# Patient Record
Sex: MALE | Race: WHITE | NOT HISPANIC OR LATINO | Employment: OTHER | URBAN - METROPOLITAN AREA
[De-identification: names, ages, dates, MRNs, and addresses within clinical notes are randomized per-mention and may not be internally consistent; named-entity substitution may affect disease eponyms.]

---

## 2017-12-09 ENCOUNTER — HOSPITAL ENCOUNTER (EMERGENCY)
Facility: MEDICAL CENTER | Age: 67
End: 2017-12-09
Attending: EMERGENCY MEDICINE
Payer: MEDICARE

## 2017-12-09 VITALS
BODY MASS INDEX: 37.04 KG/M2 | WEIGHT: 264.55 LBS | SYSTOLIC BLOOD PRESSURE: 152 MMHG | RESPIRATION RATE: 20 BRPM | HEIGHT: 71 IN | OXYGEN SATURATION: 92 % | DIASTOLIC BLOOD PRESSURE: 74 MMHG | TEMPERATURE: 97.6 F | HEART RATE: 56 BPM

## 2017-12-09 DIAGNOSIS — I80.01 THROMBOPHLEBITIS OF SAPHENOUS VEIN, RIGHT: ICD-10-CM

## 2017-12-09 LAB
ANION GAP SERPL CALC-SCNC: 6 MMOL/L (ref 0–11.9)
APTT PPP: 33.4 SEC (ref 24.7–36)
BASOPHILS # BLD AUTO: 0.4 % (ref 0–1.8)
BASOPHILS # BLD: 0.03 K/UL (ref 0–0.12)
BUN SERPL-MCNC: 11 MG/DL (ref 8–22)
CALCIUM SERPL-MCNC: 9 MG/DL (ref 8.5–10.5)
CHLORIDE SERPL-SCNC: 104 MMOL/L (ref 96–112)
CO2 SERPL-SCNC: 27 MMOL/L (ref 20–33)
CREAT SERPL-MCNC: 0.56 MG/DL (ref 0.5–1.4)
EOSINOPHIL # BLD AUTO: 0.16 K/UL (ref 0–0.51)
EOSINOPHIL NFR BLD: 2 % (ref 0–6.9)
ERYTHROCYTE [DISTWIDTH] IN BLOOD BY AUTOMATED COUNT: 42.7 FL (ref 35.9–50)
GFR SERPL CREATININE-BSD FRML MDRD: >60 ML/MIN/1.73 M 2
GLUCOSE SERPL-MCNC: 183 MG/DL (ref 65–99)
HCT VFR BLD AUTO: 41.9 % (ref 42–52)
HGB BLD-MCNC: 14.2 G/DL (ref 14–18)
IMM GRANULOCYTES # BLD AUTO: 0.02 K/UL (ref 0–0.11)
IMM GRANULOCYTES NFR BLD AUTO: 0.3 % (ref 0–0.9)
INR PPP: 1.05 (ref 0.87–1.13)
LYMPHOCYTES # BLD AUTO: 1.84 K/UL (ref 1–4.8)
LYMPHOCYTES NFR BLD: 23.3 % (ref 22–41)
MCH RBC QN AUTO: 31.3 PG (ref 27–33)
MCHC RBC AUTO-ENTMCNC: 33.9 G/DL (ref 33.7–35.3)
MCV RBC AUTO: 92.5 FL (ref 81.4–97.8)
MONOCYTES # BLD AUTO: 0.86 K/UL (ref 0–0.85)
MONOCYTES NFR BLD AUTO: 10.9 % (ref 0–13.4)
NEUTROPHILS # BLD AUTO: 4.99 K/UL (ref 1.82–7.42)
NEUTROPHILS NFR BLD: 63.1 % (ref 44–72)
NRBC # BLD AUTO: 0 K/UL
NRBC BLD AUTO-RTO: 0 /100 WBC
PLATELET # BLD AUTO: 168 K/UL (ref 164–446)
PMV BLD AUTO: 9.2 FL (ref 9–12.9)
POTASSIUM SERPL-SCNC: 4.3 MMOL/L (ref 3.6–5.5)
PROTHROMBIN TIME: 13.4 SEC (ref 12–14.6)
RBC # BLD AUTO: 4.53 M/UL (ref 4.7–6.1)
SODIUM SERPL-SCNC: 137 MMOL/L (ref 135–145)
WBC # BLD AUTO: 7.9 K/UL (ref 4.8–10.8)

## 2017-12-09 PROCEDURE — 85730 THROMBOPLASTIN TIME PARTIAL: CPT

## 2017-12-09 PROCEDURE — 99284 EMERGENCY DEPT VISIT MOD MDM: CPT

## 2017-12-09 PROCEDURE — 700102 HCHG RX REV CODE 250 W/ 637 OVERRIDE(OP): Performed by: EMERGENCY MEDICINE

## 2017-12-09 PROCEDURE — 85025 COMPLETE CBC W/AUTO DIFF WBC: CPT

## 2017-12-09 PROCEDURE — A9270 NON-COVERED ITEM OR SERVICE: HCPCS | Performed by: EMERGENCY MEDICINE

## 2017-12-09 PROCEDURE — 85610 PROTHROMBIN TIME: CPT

## 2017-12-09 PROCEDURE — 36415 COLL VENOUS BLD VENIPUNCTURE: CPT

## 2017-12-09 PROCEDURE — 80048 BASIC METABOLIC PNL TOTAL CA: CPT

## 2017-12-09 PROCEDURE — 93971 EXTREMITY STUDY: CPT

## 2017-12-09 PROCEDURE — 93971 EXTREMITY STUDY: CPT | Mod: 26 | Performed by: SURGERY

## 2017-12-09 RX ORDER — HYDROCODONE BITARTRATE AND ACETAMINOPHEN 5; 325 MG/1; MG/1
2 TABLET ORAL ONCE
Status: COMPLETED | OUTPATIENT
Start: 2017-12-09 | End: 2017-12-09

## 2017-12-09 RX ADMIN — RIVAROXABAN 15 MG: 15 TABLET, FILM COATED ORAL at 09:09

## 2017-12-09 RX ADMIN — HYDROCODONE BITARTRATE AND ACETAMINOPHEN 2 TABLET: 5; 325 TABLET ORAL at 08:11

## 2017-12-09 ASSESSMENT — LIFESTYLE VARIABLES: DO YOU DRINK ALCOHOL: NO

## 2017-12-09 ASSESSMENT — PAIN SCALES - GENERAL: PAINLEVEL_OUTOF10: 8

## 2017-12-09 NOTE — ED PROVIDER NOTES
ED Provider Note    Scribed for Tapan Allred M.D. by Mitch Watson. 12/9/2017  7:20 AM    Primary care provider: Amanda Guillaume M.D.  Means of arrival: Walk in  History obtained from: Patient  History limited by: None    CHIEF COMPLAINT  Chief Complaint   Patient presents with   • Leg Pain     R sided   • Leg Swelling       HPI  Lamont Hogan is a 67 y.o. male who presents to the Emergency Department complaining of gradual right leg swelling onset 8 days ago, on Monday. He is experiencing associated right leg redness and pain secondary to the swelling. The patient states he has a history of varicose veins. He denies taking any blood thinners. No complaints of shortness of breath or chest pain at this time.     REVIEW OF SYSTEMS  Pertinent positives include right leg swelling with redness and pain.   Pertinent negatives include no shortness of breath or chest pain.    All other systems reviewed and negative. C.     PAST MEDICAL HISTORY   has a past medical history of Arthritis; Cataract; Diabetes (CMS-[facility]); and Hypertension.    SURGICAL HISTORY   has a past surgical history that includes other (1992); other (Left, 2011); appendectomy (1990); alvin by laparoscopy (1990); cataract phaco with iol (Right, 10/11/2016); and cataract phaco with iol (Left, 10/25/2016).    SOCIAL HISTORY  Social History   Substance Use Topics   • Smoking status: Former Smoker     Packs/day: 1.00     Years: 30.00     Types: Cigarettes     Quit date: 1/1/2000   • Smokeless tobacco: Never Used   • Alcohol use No      History   Drug Use No       FAMILY HISTORY  History reviewed. No pertinent family history.    CURRENT MEDICATIONS  Home Medications     Reviewed by Monty Ochoa R.N. (Registered Nurse) on 12/09/17 at 0705  Med List Status: Partial   Medication Last Dose Status   acetaminophen (TYLENOL ARTHRITIS PAIN) 650 MG CR tablet 10/24/2016 Active   atenolol (TENORMIN) 25 MG Tab 10/25/2016 Active   atorvastatin (LIPITOR) 10 MG Tab  "10/24/2016 Active   cholestyramine (QUESTRAN,PREVALITE) 4 GM Pack 10/24/2016 Active   enalapril (VASOTEC) 20 MG tablet 10/25/2016 Active   gabapentin (NEURONTIN) 300 MG Cap 10/25/2016 Active   hydrocodone-acetaminophen (VICODIN) 5-500 MG Tab 10/25/2016 Active   metformin (GLUCOPHAGE) 500 MG Tab 10/24/2016 Active                ALLERGIES  Allergies   Allergen Reactions   • Ampicillin      Dizzy and nausea       PHYSICAL EXAM  VITAL SIGNS: /74   Pulse 74   Temp 36.4 °C (97.6 °F) (Temporal)   Resp 18   Ht 1.803 m (5' 11\")   Wt 120 kg (264 lb 8.8 oz)   SpO2 95%   BMI 36.90 kg/m²     Nursing note and vitals reviewed.  Constitutional: Well-developed and well-nourished. No distress.   HENT: Head is normocephalic and atraumatic. Oropharynx is clear and moist without exudate or erythema.   Eyes: Pupils are equal, round, and reactive to light. Conjunctiva are normal.   Cardiovascular: Normal rate and regular rhythm. No murmur heard. Normal radial pulses.  Pulmonary/Chest: Breath sounds normal. No wheezes or rales.   Abdominal: Soft and non-tender. No distention    Musculoskeletal: Tenderness and swelling with a palpable thrombosed varicose vein in the right medial proximal calf, diffuse 2+ pitting edema of the right lower leg, mild tenderness to medial thigh, Extremities exhibit normal range of motion   Neurological: Awake, alert and oriented to person, place, and time. No focal deficits noted.  Skin: Skin is warm and dry. No rash.   Psychiatric: Normal mood and affect. Appropriate for clinical situation    DIAGNOSTIC STUDIES / PROCEDURES    LABS  Results for orders placed or performed during the hospital encounter of 12/09/17   CBC WITH DIFFERENTIAL   Result Value Ref Range    WBC 7.9 4.8 - 10.8 K/uL    RBC 4.53 (L) 4.70 - 6.10 M/uL    Hemoglobin 14.2 14.0 - 18.0 g/dL    Hematocrit 41.9 (L) 42.0 - 52.0 %    MCV 92.5 81.4 - 97.8 fL    MCH 31.3 27.0 - 33.0 pg    MCHC 33.9 33.7 - 35.3 g/dL    RDW 42.7 35.9 - 50.0 " fL    Platelet Count 168 164 - 446 K/uL    MPV 9.2 9.0 - 12.9 fL    Neutrophils-Polys 63.10 44.00 - 72.00 %    Lymphocytes 23.30 22.00 - 41.00 %    Monocytes 10.90 0.00 - 13.40 %    Eosinophils 2.00 0.00 - 6.90 %    Basophils 0.40 0.00 - 1.80 %    Immature Granulocytes 0.30 0.00 - 0.90 %    Nucleated RBC 0.00 /100 WBC    Neutrophils (Absolute) 4.99 1.82 - 7.42 K/uL    Lymphs (Absolute) 1.84 1.00 - 4.80 K/uL    Monos (Absolute) 0.86 (H) 0.00 - 0.85 K/uL    Eos (Absolute) 0.16 0.00 - 0.51 K/uL    Baso (Absolute) 0.03 0.00 - 0.12 K/uL    Immature Granulocytes (abs) 0.02 0.00 - 0.11 K/uL    NRBC (Absolute) 0.00 K/uL   BASIC METABOLIC PANEL   Result Value Ref Range    Sodium 137 135 - 145 mmol/L    Potassium 4.3 3.6 - 5.5 mmol/L    Chloride 104 96 - 112 mmol/L    Co2 27 20 - 33 mmol/L    Glucose 183 (H) 65 - 99 mg/dL    Bun 11 8 - 22 mg/dL    Creatinine 0.56 0.50 - 1.40 mg/dL    Calcium 9.0 8.5 - 10.5 mg/dL    Anion Gap 6.0 0.0 - 11.9   PROTHROMBIN TIME   Result Value Ref Range    PT 13.4 12.0 - 14.6 sec    INR 1.05 0.87 - 1.13   APTT   Result Value Ref Range    APTT 33.4 24.7 - 36.0 sec   ESTIMATED GFR   Result Value Ref Range    GFR If African American >60 >60 mL/min/1.73 m 2    GFR If Non African American >60 >60 mL/min/1.73 m 2      All labs reviewed by me.    RADIOLOGY  LE VENOUS DUPLEX (Specify in Comments Left, Right Or Bilateral)           The radiologist's interpretation of all radiological studies have been reviewed by me.    COURSE & MEDICAL DECISION MAKING  Nursing notes, VS, PMSFHx reviewed in chart.     Review of past medical records shows the patient has no recent ED visits.     7:20 AM - Patient seen and examined at bedside. Patient will be treated with Norco 5-325 2 tab. Ordered LE venous duplex, CBC, BMP, prothrombin time and APTT to evaluate his symptoms. The differential diagnoses include but are not limited to: Deep venous thrombosis, superficial thrombophlebitis. I explained to the patient it  appears he is experiencing a blood clot in his leg upon examination, which I will order an US for to further assess his condition.     7:59 AM Given the proximity of the saphenous thrombosis to deep system, patient is appropriate candidate for anticoagulation.     8:03 AM Ordered Xarelto 15 mg.     8:04 AM Patient was reevaluated at bedside. Discussed the status of the patient's clot and recommended follow up. The patient will be discharged with blood thinners. Patient agrees to plan of care.       The patient will return for new or worsening symptoms and is stable at the time of discharge.    The patient is referred to a primary physician for blood pressure management, diabetic screening, and for all other preventative health concerns.    DISPOSITION:  Patient will be discharged home in stable condition.    FOLLOW UP:  [facility], Emergency Dept  [address] [address] [phone]    If symptoms worsen    [facility]ticoagulation [facility]  [address] [place] [id number]  [phone]    Schedule an appointment as soon as possible for a visit      OUTPATIENT MEDICATIONS:  New Prescriptions    RIVAROXABAN (XARELTO) 15 MG TAB TABLET    Take 1 Tab by mouth 2 Times a Day for 21 days.     FINAL IMPRESSION  1. Thrombophlebitis of saphenous vein, right        Mitch FREITAS (Jua nMiguel), am scribing for, and in the presence of, Tapan Allred M.D..    Electronically signed by: Mitch Watson (Juan Miguel), 12/9/2017    Tapan FREITAS M.D. personally performed the services described in this documentation, as scribed by Mitch Watson in my presence, and it is both accurate and complete.    The note accurately reflects work and decisions made by me.  Tapan Allred  12/9/2017  11:36 AM

## 2017-12-09 NOTE — ED NOTES
Discharge instructions given.  All questions answered.  Prescriptions given x1.  Pt to follow-up with Anticoagulation services, return here if symptoms worsen.  Pt verbalized understanding.  All belongings with pt.  Pt ambulated to lobby.

## 2017-12-09 NOTE — ED NOTES
"Chief Complaint   Patient presents with   • Leg Pain     R sided   • Leg Swelling     Blood pressure 152/74, pulse 74, temperature 36.4 °C (97.6 °F), temperature source Temporal, resp. rate 18, height 1.803 m (5' 11\"), weight 120 kg (264 lb 8.8 oz), SpO2 95 %.    Pt ambulatory to triage. Pt c/o pain since Monday. 7/10. Redness and swelling on R inner calf. Pt leg is warm to touch. Pt is not on any blood thinners. No cardiac hx. Pt denies trauma.     Explained wait time and triage process to pt. Pt placed back out in lobby, told to notify ED tech or triage RN of any changes, verbalized understanding.      "

## 2017-12-09 NOTE — DISCHARGE INSTRUCTIONS
Phlebitis  Phlebitis is soreness and swelling (inflammation) of a vein. This can occur in your arms, legs, or torso (trunk), as well as deeper inside your body. Phlebitis is usually not serious when it occurs close to the surface of the body. However, it can cause serious problems when it occurs in a vein deeper inside the body.  CAUSES   Phlebitis can be triggered by various things, including:   · Reduced blood flow through your veins. This can happen with:  ¨ Bed rest over a long period.  ¨ Long-distance travel.  ¨ Injury.  ¨ Surgery.  ¨ Being overweight (obese) or pregnant.  · Having an IV tube put in the vein and getting certain medicines through the vein.  · Cancer and cancer treatment.  · Use of illegal drugs taken through the vein.  · Inflammatory diseases.  · Inherited (genetic) diseases that increase the risk of blood clots.  · Hormone therapy, such as birth control pills.  SIGNS AND SYMPTOMS   · Red, tender, swollen, and painful area on your skin. Usually, the area will be long and narrow.  · Firmness along the center of the affected area. This can indicate that a blood clot has formed.  · Low-grade fever.  DIAGNOSIS   A health care provider can usually diagnose phlebitis by examining the affected area and asking about your symptoms. To check for infection or blood clots, your health care provider may order blood tests or an ultrasound exam of the area. Blood tests and your family history may also indicate if you have an underlying genetic disease that causes blood clots. Occasionally, a piece of tissue is taken from the body (biopsy sample) if an unusual cause of phlebitis is suspected.  TREATMENT   Treatment will vary depending on the severity of the condition and the area of the body affected. Treatment may include:  · Use of a warm compress or heating pad.  · Use of compression stockings or bandages.  · Anti-inflammatory medicines.  · Removal of any IV tube that may be causing the problem.  · Medicines  that kill germs (antibiotics) if an infection is present.  · Blood-thinning medicines if a blood clot is suspected or present.  · In rare cases, surgery may be needed to remove damaged sections of vein.  HOME CARE INSTRUCTIONS   · Only take over-the-counter or prescription medicines as directed by your health care provider. Take all medicines exactly as prescribed.  · Raise (elevate) the affected area above the level of your heart as directed by your health care provider.  · Apply a warm compress or heating pad to the affected area as directed by your health care provider. Do not sleep with the heating pad.  · Use compression stockings or bandages as directed. These will speed healing and prevent the condition from coming back.  · If you are on blood thinners:  ¨ Get follow-up blood tests as directed by your health care provider.  ¨ Check with your health care provider before using any new medicines.  ¨ Carry a medical alert card or wear your medical alert jewelry to show that you are on blood thinners.  · For phlebitis in the legs:  ¨ Avoid prolonged standing or bed rest.  ¨ Keep your legs moving. Raise your legs when sitting or lying.  · Do not smoke.  · Women, particularly those over the age of 35, should consider the risks and benefits of taking the contraceptive pill. This kind of hormone treatment can increase your risk for blood clots.  · Follow up with your health care provider as directed.  SEEK MEDICAL CARE IF:   · You have unusual bruising or any bleeding problems.  · Your swelling or pain in the affected area is not improving.  · You are on anti-inflammatory medicine, and you develop belly (abdominal) pain.  SEEK IMMEDIATE MEDICAL CARE IF:   · You have a sudden onset of chest pain or difficulty breathing.  · You have a fever or persistent symptoms for more than 2-3 days.  · You have a fever and your symptoms suddenly get worse.  MAKE SURE YOU:  · Understand these instructions.  · Will watch your  condition.  · Will get help right away if you are not doing well or get worse.     This information is not intended to replace advice given to you by your health care provider. Make sure you discuss any questions you have with your health care provider.     Document Released: 12/12/2002 Document Revised: 10/08/2014 Document Reviewed: 08/25/2014  [employer] Interactive Patient Education ©2016 [employer]

## 2017-12-14 ENCOUNTER — ANTICOAGULATION VISIT (OUTPATIENT)
Dept: VASCULAR LAB | Facility: MEDICAL CENTER | Age: 67
End: 2017-12-14
Attending: EMERGENCY MEDICINE
Payer: MEDICARE

## 2017-12-14 ENCOUNTER — TELEPHONE (OUTPATIENT)
Dept: VASCULAR LAB | Facility: MEDICAL CENTER | Age: 67
End: 2017-12-14

## 2017-12-14 VITALS — HEART RATE: 106 BPM | OXYGEN SATURATION: 96 % | SYSTOLIC BLOOD PRESSURE: 154 MMHG | DIASTOLIC BLOOD PRESSURE: 88 MMHG

## 2017-12-14 DIAGNOSIS — I82.409 ACUTE DEEP VEIN THROMBOSIS (DVT) OF OTHER VEIN OF LOWER EXTREMITY, UNSPECIFIED LATERALITY (HCC): ICD-10-CM

## 2017-12-14 DIAGNOSIS — I82.890 SUPERFICIAL VEIN THROMBOSIS: ICD-10-CM

## 2017-12-14 LAB
INR BLD: 1.4 (ref 0.9–1.2)
INR PPP: 1.4 (ref 2–3.5)

## 2017-12-14 PROCEDURE — 99213 OFFICE O/P EST LOW 20 MIN: CPT

## 2017-12-14 PROCEDURE — 85610 PROTHROMBIN TIME: CPT

## 2017-12-14 NOTE — PROGRESS NOTES
"Anticoagulation Summary  As of 12/14/2017    INR goal:      TTR:   --   Today's INR:   1.4   Maintenance plan:   No maintenance plan   Next INR check:      Target end date:       Indications    Deep vein thrombosis (CMS-[facility]) [I82.409] [I82.409]             Anticoagulation Episode Summary     INR check location:       Preferred lab:       Send INR reminders to:       Comments:         Anticoagulation Care Providers     Provider Role Specialty Phone number    Tapan Allred M.D. Referring Emergency Medicine [phone]    [facility] Responsible  [phone]        Anticoagulation Patient Findings     Pt is new to Xarelto and new to [facility].  Discussed indication for drug therapy.  Explained our services, hours of operation, Xarelto therapy, potential SE, potential DI.. Discussed monitoring parameters, such as blood in urine, blood in stool, discussed what to do if a dose is missed, or suspected as missed.  Pt to continue with current Xarelto dosing regimen. Pt denies any unusual s/s of bleeding, bruising, clotting or any changes to diet or medications.    Pt was diagnosed with a VTE in RLE.  Duplex from 12/9 states:   \"The greater saphenous vein is dilated, incompressible & filled with acute    to subacute appearing material from 2 cm distal to the saphenofemoral    junction through the ankle.  Also varicose veins are incompressible in the    proximal calf.\"    Pt had developed hallmark s/s of DVT.  Today he states swelling is greatly improved, no longer hot to the touch.  Pain is improving, but still uncomfortable.  Discussed compression stocking, pt refuses at this time.      Denies any provoking incident.   Denies HRT.  Denies previous hx of VTE. Denies family hx of VTE.  Denies SOB or CP; oxygen saturation is 96% on RA.     Given the size of the VTE along with close proximity to femoral junction, pt would benefit from anticoagulation.  He confirms medication is affordable at this time. "     CrCl >50 mL/min.  Medications reconciled.   Added [facility] to Care Team     Answered pt questions, follow up on 12/28, prior to his transition date to Xarelto 20 mg daily which should take place on 12/31/17.  Likely plan on 3 months of therapy when anticoagulation can be reassessed.  Please follow up pt has PCP at next visit.     Joshua Trujillo, PharmD   CC Dr Michael Bloch

## 2017-12-14 NOTE — TELEPHONE ENCOUNTER
Initial anticoag note and most recent urgent care note reviewed.  Patient started on anticoag with xarelto due to superficial vein thrombosis that extends to the junction with the deep system.    Appears unprovoked  Will continue for 3 months and then re-eval in vascular medicine clinic.  Will defer all age appropriate screening for occult malignancy to new PCP.    Michael Bloch, MD  Anticoagulation Clinic

## 2017-12-15 PROBLEM — I82.890 SUPERFICIAL VEIN THROMBOSIS: Status: ACTIVE | Noted: 2017-12-15

## 2017-12-28 ENCOUNTER — ANTICOAGULATION VISIT (OUTPATIENT)
Dept: VASCULAR LAB | Facility: MEDICAL CENTER | Age: 67
End: 2017-12-28
Attending: EMERGENCY MEDICINE
Payer: MEDICARE

## 2017-12-28 VITALS — SYSTOLIC BLOOD PRESSURE: 156 MMHG | DIASTOLIC BLOOD PRESSURE: 79 MMHG | HEART RATE: 55 BPM

## 2017-12-28 DIAGNOSIS — I82.890 SUPERFICIAL VEIN THROMBOSIS: ICD-10-CM

## 2017-12-28 LAB
INR BLD: 1.3 (ref 0.9–1.2)
INR PPP: 1.3 (ref 2–3.5)

## 2017-12-28 PROCEDURE — 85610 PROTHROMBIN TIME: CPT

## 2017-12-28 PROCEDURE — 99212 OFFICE O/P EST SF 10 MIN: CPT | Performed by: NURSE PRACTITIONER

## 2017-12-28 NOTE — PROGRESS NOTES
Diagnosis: RLE DVT  Drug: Xarelto 15 mg BID x 21 days then 20 mg daily with food  Length of therapy: 3 months then reevaluate    Health Status Since Last Assessment  No major changes. Still has minor swelling to his right leg. Is elevating as much as possible. No SOB or CP. He can afford this medication. He mentions having back surgery in June but no recent known provoking factors.    Adherence with DOAC Therapy  None  BLEEDING RISK ASSESSMENT NB:    Bleeding Risk Assessment    None of the following reported:  Severe epistaxis Hemoptysis   Excessive or unusual bruising / hematomas   GIB / melena / BRBPR / hematemesis   Hematuria  Concerning daily headache or subdural hematoma symptoms   Decreasing hemoglobin or new anemia   Latest hemoglobin:     Lab Results   Component Value Date/Time    WBC 7.9 12/09/2017 08:20 AM    RBC 4.53 (L) 12/09/2017 08:20 AM    HEMOGLOBIN 14.2 12/09/2017 08:20 AM    HEMATOCRIT 41.9 (L) 12/09/2017 08:20 AM    MCV 92.5 12/09/2017 08:20 AM    MCH 31.3 12/09/2017 08:20 AM    MCHC 33.9 12/09/2017 08:20 AM    MPV 9.2 12/09/2017 08:20 AM    NEUTSPOLYS 63.10 12/09/2017 08:20 AM    LYMPHOCYTES 23.30 12/09/2017 08:20 AM    MONOCYTES 10.90 12/09/2017 08:20 AM    EOSINOPHILS 2.00 12/09/2017 08:20 AM    BASOPHILS 0.40 12/09/2017 08:20 AM        EtOH overuse - no  Falls, presyncope, syncope, or seizures - no  Uncontrolled hypertension - no  CREATININE CLEARANCE /    Creatinine Clearance/Renal Function  Latest eGFR / creatinine:  Lab Results   Component Value Date/Time    SODIUM 137 12/09/2017 08:20 AM    POTASSIUM 4.3 12/09/2017 08:20 AM    CHLORIDE 104 12/09/2017 08:20 AM    CO2 27 12/09/2017 08:20 AM    GLUCOSE 183 (H) 12/09/2017 08:20 AM    BUN 11 12/09/2017 08:20 AM    CREATININE 0.56 12/09/2017 08:20 AM      • Is eGFR less than 50ml/min - yes  If YES, calculate CrCl (see back)  Any recent dehydrating illness or medications added/changed? i.e. diuretics    Drug Interactions  ASA / other  antiplatelets - avoids  NSAID - avoids   Other drug interactions (Review med list / OTCs;)  Current Outpatient Prescriptions on File Prior to Visit   Medication Sig Dispense Refill   • rivaroxaban (XARELTO) 15 MG Tab tablet Take 1 Tab by mouth 2 Times a Day for 21 days. 42 Tab 0   • cholestyramine (QUESTRAN,PREVALITE) 4 GM Pack Take  by mouth every day. Takes two     • atenolol (TENORMIN) 25 MG Tab Take 25 mg by mouth every day.     • metformin (GLUCOPHAGE) 500 MG Tab Take 500 mg by mouth every day.     • atorvastatin (LIPITOR) 10 MG Tab Take 10 mg by mouth every day.     • enalapril (VASOTEC) 20 MG tablet Take 20 mg by mouth 2 times a day.     • gabapentin (NEURONTIN) 300 MG Cap Take 300 mg by mouth 3 times a day. Takes two     • hydrocodone-acetaminophen (VICODIN) 5-500 MG Tab Take 1-2 Tabs by mouth 2 Times a Day.     • acetaminophen (TYLENOL ARTHRITIS PAIN) 650 MG CR tablet Take 650 mg by mouth as needed.       No current facility-administered medications on file prior to visit.        Examination  Blood pressure:156/79  • Elevated BP - he states his BP runs lower normally which he checks at home (sBP greater than 160 mmHg)  • Symptomatic hypotension - no  Significant gait impairment / imbalance / high risk for falls - no    Final Assessment and Recommendations:  Patient appears stable from the anticoagulation standpoint  Benefits of continued DOAC therapy outweigh risks for this patient    Written rx for 20 mg tablets given to pt (#30, refills 3)     - Finish taking Xarelto 15 mg by mouth twice daily with food then start Xarelto 20 mg daily with food   - Do NOT stop this medication unless you get permission from our clinic   - Seek medical attention for any worsening swelling/redness/pain to any extremity or for shortness of breath, pain with breathing   -  refills before you run out      Other action taken:    Other Actions:   The rationale for continued DOAC therapy  The potential for minor, major or  life-threatening bleeding  Dosing instructions, adherence, risks of non-adherence, handling missed doses  Avoiding OTC ASA & NSAIDs & minimizing EtOH to reduce bleeding risks  Dosing around upcoming procedure / surgery if applicable (see back)    Follow up:  Vascular f/u at the end of 3 months of therapy    Next Appointment: Monday, March 5th, 2018 at 8:00 am.    Tamara RICARDO

## 2018-03-05 ENCOUNTER — ANTICOAGULATION VISIT (OUTPATIENT)
Dept: VASCULAR LAB | Facility: MEDICAL CENTER | Age: 68
End: 2018-03-05
Attending: INTERNAL MEDICINE
Payer: MEDICARE

## 2018-03-05 DIAGNOSIS — I82.890 SUPERFICIAL VEIN THROMBOSIS: ICD-10-CM

## 2018-03-05 PROCEDURE — 99213 OFFICE O/P EST LOW 20 MIN: CPT | Performed by: NURSE PRACTITIONER

## 2018-03-05 PROCEDURE — 99212 OFFICE O/P EST SF 10 MIN: CPT | Performed by: NURSE PRACTITIONER

## 2018-03-05 RX ORDER — ASPIRIN 81 MG/1
81 TABLET, CHEWABLE ORAL DAILY
COMMUNITY
End: 2022-04-18

## 2018-03-05 ASSESSMENT — ENCOUNTER SYMPTOMS
CHILLS: 0
HEMOPTYSIS: 0
MYALGIAS: 0
FEVER: 0
LOSS OF CONSCIOUSNESS: 0
SEIZURES: 0
SHORTNESS OF BREATH: 0
BLOOD IN STOOL: 0
BRUISES/BLEEDS EASILY: 0
FALLS: 0
DIZZINESS: 0

## 2018-03-05 NOTE — PROGRESS NOTES
- stop taking Xarelto  - start aspirin 81 mg by mouth daily  - monitor closely for signs and symptoms of blood clots (pain, redness, swelling in any extremity or for shortness of breath or pain with breathing) - go to the ER if you have these symptoms  - keep up with all your cancer screenings  - let all your doctors know you have a history of a blood clot  - continue to avoid tobacco    Tamara RICARDO  [phone]

## 2018-03-05 NOTE — PROGRESS NOTES
"VASCULAR ANTI-COAGULATION VISIT  Subjective:   Lamont Hogan is a 67 y.o. male who presents today 3/5/2018 for   No chief complaint on file.      HPI: Patient presents today for evaluation of anticoagulation therapy. Pt presented to the ER with RLE swelling, pain and redness. Had 1st time VTE 12/9/17 - US showed \"Extensive acute and subacute right lower extremity SVT involving greater saphenous vein and varices-no obvious acute DVT.\" Hx of varicose veins. Started on Xarelto given the proximity of the saphenous thrombosis to the deep system.     Denies ever having blood clots in the past. No known provoking factors such as recent surgeries, traumas or extended travel. Reports having a brother with one time DVT several years ago. No other FH. He is obese. Relatively inactive. Stopped smoking 1 year ago with no relapses. Had stopped and restarted several times before.     [facility] and reports being up to date on age-appropriate cancer screenings. Last colonoscopy in 2016. Never on HRTs. No problems with bleeding or excessive bruising. No chest pain or SOB. No further leg swelling. Mild tenderness to varicosities. Doesn't use a compression stocking. Reports having headaches about 1 hour after taking Xarelto. Takes this medication every AM with breakfast. Has completed 3 months of therapy. He wants to stop taking Xarelto.    PAST MEDICAL HISTORY   has a past medical history of Arthritis; Cataract; Diabetes (CMS-HCC); and Hypertension     SURGICAL HISTORY   has a past surgical history that includes other (1992); other (Left, 2011); appendectomy (1990); alvin by laparoscopy (1990); cataract phaco with iol (Right, 10/11/2016); and cataract phaco with iol (Left, 10/25/2016).    Social History   Substance Use Topics   • Smoking status: Former Smoker     Packs/day: 1.00     Years: 30.00     Types: Cigarettes     Quit date: 1/1/2000   • Smokeless tobacco: Never Used   • Alcohol use No     DIET AND " EXERCISE:  Weight Change: none  Diet: common adult  Exercise: no regular exercise program     Review of Systems   Constitutional: Negative for chills and fever.   HENT: Negative for nosebleeds.    Respiratory: Negative for hemoptysis and shortness of breath.    Cardiovascular: Negative for chest pain and leg swelling.   Gastrointestinal: Negative for blood in stool and melena.   Genitourinary: Negative for hematuria.   Musculoskeletal: Negative for falls and myalgias.   Skin: Negative for rash.   Neurological: Negative for dizziness, seizures and loss of consciousness.   Endo/Heme/Allergies: Does not bruise/bleed easily.      Objective:   There were no vitals filed for this visit.  There is no height or weight on file to calculate BMI.  Physical Exam   Constitutional: He is oriented to person, place, and time. He appears well-developed and well-nourished.   Cardiovascular: Normal rate.    Pulmonary/Chest: Effort normal.   Musculoskeletal: Normal range of motion.   Neurological: He is alert and oriented to person, place, and time.   Skin: Skin is warm and dry.   Psychiatric: He has a normal mood and affect. His behavior is normal. Judgment normal.         Lab Results   Component Value Date    PROTHROMBTM 13.4 12/09/2017    INR 1.3 12/28/2017         Lab Results   Component Value Date    SODIUM 137 12/09/2017    POTASSIUM 4.3 12/09/2017    CHLORIDE 104 12/09/2017    CO2 27 12/09/2017    GLUCOSE 183 (H) 12/09/2017    BUN 11 12/09/2017    CREATININE 0.56 12/09/2017        Lab Results   Component Value Date    WBC 7.9 12/09/2017    RBC 4.53 (L) 12/09/2017    HEMOGLOBIN 14.2 12/09/2017    HEMATOCRIT 41.9 (L) 12/09/2017    MCV 92.5 12/09/2017    MCH 31.3 12/09/2017    MCHC 33.9 12/09/2017    MPV 9.2 12/09/2017 12/9/17 US   CONCLUSIONS   Extensive acute and subacute right lower extremity SVT involving greater    saphenous vein and varices-no obvious acute DVT   Recommend follow-up ultrasound to rule out propagation  into deep venous    system    Medical Decision Making:  Today's Assessment / Status / Plan:     1. Superficial vein thrombosis       Indication for anticoagulation: Superficial vein thrombosis that extends into the junction of the deep system    Anti-Platelet/Anti-Coagulant Tx: Xarelto 20 mg QD    Anti-Coagulation Plan: Discussed, at length, with patient and his wife regarding the risks/benefits of long term anticoagulation and the ACCP guidelines for extended therapy following an unprovoked clot. Let pt know there is a 5-10% higher risk of recurrence after having any VTE. Discussed that there is risk for bleeding while on any anticoagulant and that there is no immediate reversal agent currently available for Xarelto. Given the proximity of the superficial saphenous clot to the deep system, he was treated for DVT. D/t unprovoked nature of his VTE, we discussed the possibility of a hypercoag w/u to r/o any predisposing genetic factors for increased VTE risk. He declines at this time.  After much discussion, pt wishes to stop Xarelto. Pt understands that it is our recommendation for indefinite therapy based on current guidelines for unprovoked VTE.    - stop taking Xarelto  - start aspirin 81 mg by mouth daily  - monitor closely for signs and symptoms of blood clots (pain, redness, swelling in any extremity or for shortness of breath or pain with breathing) - go to the ER if you have these symptoms  - keep up with all your cancer screenings  - let all your doctors know you have a history of a blood clot  - continue to avoid tobacco    Smoking: continued abstinence    Physical Activity: frequency : goal is  3-4 times a week    Weight Management and Nutrition:exercise counseling and nutrition counseling    Instructed to follow-up with PCP for remainder of adult medical needs: yes  We will partner with other provider in the management of established vascular disease and cardiometabolic risk factors    Studies to Be  Obtained: None  Labs to Be Obtained: None    [facility]    ROBIN Cole.    CC:   Bloch, Michael J, M.D.

## 2018-04-03 ENCOUNTER — APPOINTMENT (OUTPATIENT)
Dept: RADIOLOGY | Facility: MEDICAL CENTER | Age: 68
End: 2018-04-03
Attending: EMERGENCY MEDICINE
Payer: MEDICARE

## 2018-04-03 ENCOUNTER — HOSPITAL ENCOUNTER (EMERGENCY)
Facility: MEDICAL CENTER | Age: 68
End: 2018-04-03
Attending: EMERGENCY MEDICINE
Payer: MEDICARE

## 2018-04-03 VITALS
OXYGEN SATURATION: 96 % | WEIGHT: 270.73 LBS | TEMPERATURE: 97 F | DIASTOLIC BLOOD PRESSURE: 101 MMHG | SYSTOLIC BLOOD PRESSURE: 193 MMHG | RESPIRATION RATE: 14 BRPM | HEIGHT: 71 IN | HEART RATE: 69 BPM | BODY MASS INDEX: 37.9 KG/M2

## 2018-04-03 DIAGNOSIS — G47.00 INSOMNIA, UNSPECIFIED TYPE: ICD-10-CM

## 2018-04-03 DIAGNOSIS — R51.9 NONINTRACTABLE HEADACHE, UNSPECIFIED CHRONICITY PATTERN, UNSPECIFIED HEADACHE TYPE: ICD-10-CM

## 2018-04-03 LAB
ALBUMIN SERPL BCP-MCNC: 3.8 G/DL (ref 3.2–4.9)
ALBUMIN/GLOB SERPL: 1.7 G/DL
ALP SERPL-CCNC: 72 U/L (ref 30–99)
ALT SERPL-CCNC: 21 U/L (ref 2–50)
ANION GAP SERPL CALC-SCNC: 7 MMOL/L (ref 0–11.9)
AST SERPL-CCNC: 26 U/L (ref 12–45)
BASOPHILS # BLD AUTO: 0.6 % (ref 0–1.8)
BASOPHILS # BLD: 0.04 K/UL (ref 0–0.12)
BILIRUB SERPL-MCNC: 0.7 MG/DL (ref 0.1–1.5)
BUN BLD-MCNC: 11 MG/DL (ref 8–22)
BUN SERPL-MCNC: 11 MG/DL (ref 8–22)
CA-I BLD ISE-SCNC: 1.07 MMOL/L (ref 1.1–1.3)
CALCIUM SERPL-MCNC: 8.6 MG/DL (ref 8.4–10.2)
CHLORIDE BLD-SCNC: 106 MMOL/L (ref 96–112)
CHLORIDE SERPL-SCNC: 106 MMOL/L (ref 96–112)
CO2 BLD-SCNC: 27 MMOL/L (ref 20–33)
CO2 SERPL-SCNC: 24 MMOL/L (ref 20–33)
CREAT BLD-MCNC: 0.5 MG/DL (ref 0.5–1.4)
CREAT SERPL-MCNC: 0.67 MG/DL (ref 0.5–1.4)
EKG IMPRESSION: NORMAL
EOSINOPHIL # BLD AUTO: 0.11 K/UL (ref 0–0.51)
EOSINOPHIL NFR BLD: 1.7 % (ref 0–6.9)
ERYTHROCYTE [DISTWIDTH] IN BLOOD BY AUTOMATED COUNT: 41.3 FL (ref 35.9–50)
GLOBULIN SER CALC-MCNC: 2.2 G/DL (ref 1.9–3.5)
GLUCOSE BLD-MCNC: 120 MG/DL (ref 65–99)
GLUCOSE SERPL-MCNC: 169 MG/DL (ref 65–99)
HCT VFR BLD AUTO: 43.8 % (ref 42–52)
HCT VFR BLD CALC: 36 % (ref 42–52)
HGB BLD-MCNC: 12.2 G/DL (ref 14–18)
HGB BLD-MCNC: 14.8 G/DL (ref 14–18)
IMM GRANULOCYTES # BLD AUTO: 0.03 K/UL (ref 0–0.11)
IMM GRANULOCYTES NFR BLD AUTO: 0.5 % (ref 0–0.9)
INR PPP: 1.04 (ref 0.87–1.13)
LYMPHOCYTES # BLD AUTO: 1.92 K/UL (ref 1–4.8)
LYMPHOCYTES NFR BLD: 29.1 % (ref 22–41)
MCH RBC QN AUTO: 30.8 PG (ref 27–33)
MCHC RBC AUTO-ENTMCNC: 33.8 G/DL (ref 33.7–35.3)
MCV RBC AUTO: 91.3 FL (ref 81.4–97.8)
MONOCYTES # BLD AUTO: 0.57 K/UL (ref 0–0.85)
MONOCYTES NFR BLD AUTO: 8.6 % (ref 0–13.4)
NEUTROPHILS # BLD AUTO: 3.92 K/UL (ref 1.82–7.42)
NEUTROPHILS NFR BLD: 59.5 % (ref 44–72)
NRBC # BLD AUTO: 0 K/UL
NRBC BLD-RTO: 0 /100 WBC
PLATELET # BLD AUTO: 188 K/UL (ref 164–446)
PMV BLD AUTO: 9.9 FL (ref 9–12.9)
POTASSIUM BLD-SCNC: 4.5 MMOL/L (ref 3.6–5.5)
POTASSIUM SERPL-SCNC: 3.9 MMOL/L (ref 3.6–5.5)
PROT SERPL-MCNC: 6 G/DL (ref 6–8.2)
PROTHROMBIN TIME: 13.3 SEC (ref 12–14.6)
RBC # BLD AUTO: 4.8 M/UL (ref 4.7–6.1)
SODIUM BLD-SCNC: 142 MMOL/L (ref 135–145)
SODIUM SERPL-SCNC: 137 MMOL/L (ref 135–145)
WBC # BLD AUTO: 6.6 K/UL (ref 4.8–10.8)

## 2018-04-03 PROCEDURE — 80047 BASIC METABLC PNL IONIZED CA: CPT | Mod: XU

## 2018-04-03 PROCEDURE — 80053 COMPREHEN METABOLIC PANEL: CPT

## 2018-04-03 PROCEDURE — 70496 CT ANGIOGRAPHY HEAD: CPT

## 2018-04-03 PROCEDURE — 700117 HCHG RX CONTRAST REV CODE 255: Performed by: EMERGENCY MEDICINE

## 2018-04-03 PROCEDURE — 96375 TX/PRO/DX INJ NEW DRUG ADDON: CPT

## 2018-04-03 PROCEDURE — 93005 ELECTROCARDIOGRAM TRACING: CPT

## 2018-04-03 PROCEDURE — 96374 THER/PROPH/DIAG INJ IV PUSH: CPT

## 2018-04-03 PROCEDURE — 93005 ELECTROCARDIOGRAM TRACING: CPT | Performed by: EMERGENCY MEDICINE

## 2018-04-03 PROCEDURE — 85610 PROTHROMBIN TIME: CPT

## 2018-04-03 PROCEDURE — 85025 COMPLETE CBC W/AUTO DIFF WBC: CPT

## 2018-04-03 PROCEDURE — 94760 N-INVAS EAR/PLS OXIMETRY 1: CPT

## 2018-04-03 PROCEDURE — 36415 COLL VENOUS BLD VENIPUNCTURE: CPT

## 2018-04-03 PROCEDURE — 85014 HEMATOCRIT: CPT | Mod: XU

## 2018-04-03 PROCEDURE — 99284 EMERGENCY DEPT VISIT MOD MDM: CPT

## 2018-04-03 PROCEDURE — 700111 HCHG RX REV CODE 636 W/ 250 OVERRIDE (IP): Performed by: EMERGENCY MEDICINE

## 2018-04-03 RX ORDER — METFORMIN HYDROCHLORIDE 500 MG/1
500 TABLET, EXTENDED RELEASE ORAL DAILY
COMMUNITY

## 2018-04-03 RX ORDER — METHOCARBAMOL 500 MG/1
500 TABLET, FILM COATED ORAL 3 TIMES DAILY PRN
Status: SHIPPED | COMMUNITY
End: 2023-04-03

## 2018-04-03 RX ORDER — LISINOPRIL 20 MG/1
20 TABLET ORAL DAILY
Status: SHIPPED | COMMUNITY
End: 2022-04-18

## 2018-04-03 RX ORDER — DEXAMETHASONE SODIUM PHOSPHATE 4 MG/ML
10 INJECTION, SOLUTION INTRA-ARTICULAR; INTRALESIONAL; INTRAMUSCULAR; INTRAVENOUS; SOFT TISSUE ONCE
Status: COMPLETED | OUTPATIENT
Start: 2018-04-03 | End: 2018-04-03

## 2018-04-03 RX ORDER — DIPHENHYDRAMINE HYDROCHLORIDE 50 MG/ML
25 INJECTION INTRAMUSCULAR; INTRAVENOUS ONCE
Status: COMPLETED | OUTPATIENT
Start: 2018-04-03 | End: 2018-04-03

## 2018-04-03 RX ORDER — HYDROCODONE BITARTRATE AND ACETAMINOPHEN 5; 325 MG/1; MG/1
1 TABLET ORAL EVERY EVENING
Status: SHIPPED | COMMUNITY
End: 2022-04-18

## 2018-04-03 RX ORDER — KETOROLAC TROMETHAMINE 30 MG/ML
15 INJECTION, SOLUTION INTRAMUSCULAR; INTRAVENOUS ONCE
Status: COMPLETED | OUTPATIENT
Start: 2018-04-03 | End: 2018-04-03

## 2018-04-03 RX ORDER — IBUPROFEN 600 MG/1
600 TABLET ORAL EVERY 8 HOURS PRN
Status: SHIPPED | COMMUNITY
End: 2022-04-18

## 2018-04-03 RX ADMIN — DIPHENHYDRAMINE HYDROCHLORIDE 25 MG: 50 INJECTION, SOLUTION INTRAMUSCULAR; INTRAVENOUS at 11:05

## 2018-04-03 RX ADMIN — PROCHLORPERAZINE EDISYLATE 10 MG: 5 INJECTION INTRAMUSCULAR; INTRAVENOUS at 11:05

## 2018-04-03 RX ADMIN — KETOROLAC TROMETHAMINE 15 MG: 30 INJECTION, SOLUTION INTRAMUSCULAR; INTRAVENOUS at 11:05

## 2018-04-03 RX ADMIN — DEXAMETHASONE SODIUM PHOSPHATE 10 MG: 4 INJECTION, SOLUTION INTRAMUSCULAR; INTRAVENOUS at 11:05

## 2018-04-03 RX ADMIN — FENTANYL CITRATE 50 MCG: 50 INJECTION, SOLUTION INTRAMUSCULAR; INTRAVENOUS at 06:42

## 2018-04-03 RX ADMIN — IOHEXOL 100 ML: 350 INJECTION, SOLUTION INTRAVENOUS at 09:47

## 2018-04-03 ASSESSMENT — ENCOUNTER SYMPTOMS
NAUSEA: 0
MYALGIAS: 0
NECK PAIN: 0
TINGLING: 0
HEADACHES: 1
DIZZINESS: 0
FOCAL WEAKNESS: 0
INSOMNIA: 1
VOMITING: 0
FEVER: 0
SENSORY CHANGE: 0
LOSS OF CONSCIOUSNESS: 0
SEIZURES: 0
SHORTNESS OF BREATH: 1
PHOTOPHOBIA: 0
ABDOMINAL PAIN: 0

## 2018-04-03 ASSESSMENT — PAIN SCALES - GENERAL
PAINLEVEL_OUTOF10: 6
PAINLEVEL_OUTOF10: 5

## 2018-04-03 NOTE — DISCHARGE INSTRUCTIONS
Migraine Headache  A migraine headache is a very strong throbbing pain on one side or both sides of your head. Migraines can also cause other symptoms. Talk with your doctor about what things may bring on (trigger) your migraine headaches.  Follow these instructions at home:  Medicines  · Take over-the-counter and prescription medicines only as told by your doctor.  · Do not drive or use heavy machinery while taking prescription pain medicine.  · To prevent or treat constipation while you are taking prescription pain medicine, your doctor may recommend that you:  ¨ Drink enough fluid to keep your pee (urine) clear or pale yellow.  ¨ Take over-the-counter or prescription medicines.  ¨ Eat foods that are high in fiber. These include fresh fruits and vegetables, whole grains, and beans.  ¨ Limit foods that are high in fat and processed sugars. These include fried and sweet foods.  Lifestyle  · Avoid alcohol.  · Do not use any products that contain nicotine or tobacco, such as cigarettes and e-cigarettes. If you need help quitting, ask your doctor.  · Get at least 8 hours of sleep every night.  · Limit your stress.  General instructions  · Keep a journal to find out what may bring on your migraines. For example, write down:  ¨ What you eat and drink.  ¨ How much sleep you get.  ¨ Any change in what you eat or drink.  ¨ Any change in your medicines.  · If you have a migraine:  ¨ Avoid things that make your symptoms worse, such as bright lights.  ¨ It may help to lie down in a dark, quiet room.  ¨ Do not drive or use heavy machinery.  ¨ Ask your doctor what activities are safe for you.  · Keep all follow-up visits as told by your doctor. This is important.  Contact a doctor if:  · You get a migraine that is different or worse than your usual migraines.  Get help right away if:  · Your migraine gets very bad.  · You have a fever.  · You have a stiff neck.  · You have trouble seeing.  · Your muscles feel weak or like you  cannot control them.  · You start to lose your balance a lot.  · You start to have trouble walking.  · You pass out (faint).  This information is not intended to replace advice given to you by your health care provider. Make sure you discuss any questions you have with your health care provider.  Document Released: 09/26/2009 Document Revised: 07/07/2017 Document Reviewed: 06/05/2017  [email or web address] Interactive Patient Education © 2017 [employer]    Insomnia  Insomnia is a sleep disorder that makes it difficult to fall asleep or to stay asleep. Insomnia can cause tiredness (fatigue), low energy, difficulty concentrating, mood swings, and poor performance at work or school.  There are three different ways to classify insomnia:  · Difficulty falling asleep.  · Difficulty staying asleep.  · Waking up too early in the morning.  Any type of insomnia can be long-term (chronic) or short-term (acute). Both are common. Short-term insomnia usually lasts for three months or less. Chronic insomnia occurs at least three times a week for longer than three months.  What are the causes?  Insomnia may be caused by another condition, situation, or substance, such as:  · Anxiety.  · Certain medicines.  · Gastroesophageal reflux disease (GERD) or other gastrointestinal conditions.  · Asthma or other breathing conditions.  · Restless legs syndrome, sleep apnea, or other sleep disorders.  · Chronic pain.  · Menopause. This may include hot flashes.  · Stroke.  · Abuse of alcohol, tobacco, or illegal drugs.  · Depression.  · Caffeine.  · Neurological disorders, such as Alzheimer disease.  · An overactive thyroid (hyperthyroidism).  The cause of insomnia may not be known.  What increases the risk?  Risk factors for insomnia include:  · Gender. Women are more commonly affected than men.  · Age. Insomnia is more common as you get older.  · Stress. This may involve your professional or personal life.  · Income. Insomnia is more common in people  with lower income.  · Lack of exercise.  · Irregular work schedule or night shifts.  · Traveling between different time zones.  What are the signs or symptoms?  If you have insomnia, trouble falling asleep or trouble staying asleep is the main symptom. This may lead to other symptoms, such as:  · Feeling fatigued.  · Feeling nervous about going to sleep.  · Not feeling rested in the morning.  · Having trouble concentrating.  · Feeling irritable, anxious, or depressed.  How is this treated?  Treatment for insomnia depends on the cause. If your insomnia is caused by an underlying condition, treatment will focus on addressing the condition. Treatment may also include:  · Medicines to help you sleep.  · Counseling or therapy.  · Lifestyle adjustments.  Follow these instructions at home:  · Take medicines only as directed by your health care provider.  · Keep regular sleeping and waking hours. Avoid naps.  · Keep a sleep diary to help you and your health care provider figure out what could be causing your insomnia. Include:  ¨ When you sleep.  ¨ When you wake up during the night.  ¨ How well you sleep.  ¨ How rested you feel the next day.  ¨ Any side effects of medicines you are taking.  ¨ What you eat and drink.  · Make your bedroom a comfortable place where it is easy to fall asleep:  ¨ Put up shades or special blackout curtains to block light from outside.  ¨ Use a white noise machine to block noise.  ¨ Keep the temperature cool.  · Exercise regularly as directed by your health care provider. Avoid exercising right before bedtime.  · Use relaxation techniques to manage stress. Ask your health care provider to suggest some techniques that may work well for you. These may include:  ¨ Breathing exercises.  ¨ Routines to release muscle tension.  ¨ Visualizing peaceful scenes.  · Cut back on alcohol, caffeinated beverages, and cigarettes, especially close to bedtime. These can disrupt your sleep.  · Do not overeat or eat  spicy foods right before bedtime. This can lead to digestive discomfort that can make it hard for you to sleep.  · Limit screen use before bedtime. This includes:  ¨ Watching TV.  ¨ Using your smartphone, tablet, and computer.  · Stick to a routine. This can help you fall asleep faster. Try to do a quiet activity, brush your teeth, and go to bed at the same time each night.  · Get out of bed if you are still awake after 15 minutes of trying to sleep. Keep the lights down, but try reading or doing a quiet activity. When you feel sleepy, go back to bed.  · Make sure that you drive carefully. Avoid driving if you feel very sleepy.  · Keep all follow-up appointments as directed by your health care provider. This is important.  Contact a health care provider if:  · You are tired throughout the day or have trouble in your daily routine due to sleepiness.  · You continue to have sleep problems or your sleep problems get worse.  Get help right away if:  · You have serious thoughts about hurting yourself or someone else.  This information is not intended to replace advice given to you by your health care provider. Make sure you discuss any questions you have with your health care provider.  Document Released: 12/15/2001 Document Revised: 05/19/2017 Document Reviewed: 09/18/2015  [employer] Interactive Patient Education © 2017 [employer]

## 2018-04-03 NOTE — ED NOTES
The Medication Reconciliation process has been completed by interviewing the patient who reports he ran out of gabapentin about 3-4 days ago and that he was taken off of the xarelto on 3/5/18.    Allergies have been reviewed  Antibiotic use in 30 days - none    Home Pharmacy:  [facility] - [place]

## 2018-04-03 NOTE — ED PROVIDER NOTES
"ED Provider Note    ED Provider Note    Primary care provider: Pcp Pt States None  Means of arrival: POV  History obtained from: Patient  History limited by: None    CHIEF COMPLAINT  Chief Complaint   Patient presents with   • Head Ache     \"I woke up with major headache on the RT side of my head and shortly after that developed trouble breathing\". Reports blood clot to E in Jan, took xarelto until 3/5 (was told to stop). Reports freq HA since taking taking med but this is the worst.   • Shortness of Breath       HPI  Lamont Hogan is a 67 y.o. male who presents to the Emergency Department with his wife with a chief complaint of a headache. Patient states he's been having headaches for about 6 weeks. He describes the headache as pressure.Originally started, about 3 weeks after he started taking Xarelto. However, he has been off Xarelto for about 3 weeks now and they have not resolved. He denies any recent trauma or falls. He has no history of headaches prior to this. He denies a fever. No chest pain. No heat nausea, vomiting or diarrhea. He denies any new neurologic symptoms. No urinary symptoms. No neck pain.  He feels tired but otherwise he is in his normal state of health. He did wake ups with some shortness of breath which is since resolved. He was treated with anticoagulation due to a recently diagnosed thromboembolus. This was at the junction of the deep and superficial veins of the was treated with anticoagulation. Headache is on and off he has not determined any specific triggers. Does report that he has not been sleeping well. In addition, his doctor is weaning him off hydrocodone. He previously took 3 tablets a day now is down to one tablet day. He was taking this for chronic low back pain. Not for any particular reason he states for several weeks now, he has not been able to sleep. It does not stay in one place however at this time, it is behind his right eye. No family history of " migraines.    REVIEW OF SYSTEMS  Review of Systems   Constitutional: Negative for fever.   HENT: Negative for congestion.    Eyes: Negative for photophobia.   Respiratory: Positive for shortness of breath.    Cardiovascular: Negative for chest pain.   Gastrointestinal: Negative for abdominal pain, nausea and vomiting.   Genitourinary: Negative for dysuria.   Musculoskeletal: Negative for myalgias and neck pain.   Neurological: Positive for headaches. Negative for dizziness, tingling, sensory change, focal weakness, seizures and loss of consciousness.   Psychiatric/Behavioral: The patient has insomnia.        PAST MEDICAL HISTORY   has a past medical history of Arthritis; Cataract; Diabetes (CMS-HCC); and Hypertension.    SURGICAL HISTORY   has a past surgical history that includes other (1992); other (Left, 2011); appendectomy (1990); alvin by laparoscopy (1990); cataract phaco with iol (Right, 10/11/2016); and cataract phaco with iol (Left, 10/25/2016).    SOCIAL HISTORY  Social History   Substance Use Topics   • Smoking status: Former Smoker     Packs/day: 1.00     Years: 30.00     Types: Cigarettes     Quit date: 1/1/2000   • Smokeless tobacco: Never Used   • Alcohol use No      History   Drug Use No       FAMILY HISTORY  History reviewed. No pertinent family history.    CURRENT MEDICATIONS  Home Medications     Reviewed by Macrina Tillman (Pharmacy Tech) on 04/03/18 at 0909  Med List Status: Complete   Medication Last Dose Status   aspirin (ASA) 81 MG Chew Tab chewable tablet 4/2/2018 Active   atenolol (TENORMIN) 25 MG Tab 4/2/2018 Active   atorvastatin (LIPITOR) 10 MG Tab 4/2/2018 Active   cyanocobalamin (VITAMIN B12) 1000 MCG Tab 4/2/2018 Active   gabapentin (NEURONTIN) 300 MG Cap 3-4 days Active   HYDROcodone-acetaminophen (NORCO) 5-325 MG Tab per tablet 4/2/2018 Active   ibuprofen (MOTRIN) 600 MG Tab 4/2/2018 Active   lisinopril (PRINIVIL) 20 MG Tab 4/2/2018 Active   metFORMIN ER (GLUCOPHAGE XR) 500  "MG TABLET SR 24 HR 4/2/2018 Active   methocarbamol (ROBAXIN) 500 MG Tab week Active   vitamin D (CHOLECALCIFEROL) 1000 UNIT Tab 4/2/2018 Active                ALLERGIES  Allergies   Allergen Reactions   • Ampicillin Nausea     Dizzy and nausea  About 1990's         PHYSICAL EXAM  VITAL SIGNS: BP (!) 193/101   Pulse 69   Temp 36.1 °C (97 °F)   Resp 14   Ht 1.803 m (5' 11\")   Wt 122.8 kg (270 lb 11.6 oz)   SpO2 96%   BMI 37.76 kg/m²   Vitals reviewed.  Constitutional: Patient is oriented to person, place, and time. Appears well-developed and well-nourished. No distress.  obese male  Head: Normocephalic and atraumatic.   Ears: Normal external ears bilaterally.   Mouth/Throat: Oropharynx is clear and moist, no exudates.   Eyes: Conjunctivae are normal. Pupils are equal, round, and reactive to light.   Neck: Normal range of motion. Neck supple.  Cardiovascular: Normal rate, regular rhythm and normal heart sounds. Normal peripheral pulses.  Pulmonary/Chest: Effort normal and breath sounds normal. No respiratory distress, no wheezes, rhonchi, or rales.   Abdominal: Soft. Bowel sounds are normal. There is no tenderness. No rebound or guarding, or peritoneal signs. No CVA tenderness.  Musculoskeletal: No edema and no tenderness.   Lymphadenopathy: No cervical adenopathy.   Neurological: No focal deficits.  CN II through XII intact. Normal speech and cognition.  Skin: Skin is warm and dry. No erythema. No pallor.   Psychiatric: Patient has a normal mood and affect.     LABS  Results for orders placed or performed during the hospital encounter of 04/03/18   CBC WITH DIFFERENTIAL   Result Value Ref Range    WBC 6.6 4.8 - 10.8 K/uL    RBC 4.80 4.70 - 6.10 M/uL    Hemoglobin 14.8 14.0 - 18.0 g/dL    Hematocrit 43.8 42.0 - 52.0 %    MCV 91.3 81.4 - 97.8 fL    MCH 30.8 27.0 - 33.0 pg    MCHC 33.8 33.7 - 35.3 g/dL    RDW 41.3 35.9 - 50.0 fL    Platelet Count 188 164 - 446 K/uL    MPV 9.9 9.0 - 12.9 fL    Neutrophils-Polys " 59.50 44.00 - 72.00 %    Lymphocytes 29.10 22.00 - 41.00 %    Monocytes 8.60 0.00 - 13.40 %    Eosinophils 1.70 0.00 - 6.90 %    Basophils 0.60 0.00 - 1.80 %    Immature Granulocytes 0.50 0.00 - 0.90 %    Nucleated RBC 0.00 /100 WBC    Neutrophils (Absolute) 3.92 1.82 - 7.42 K/uL    Lymphs (Absolute) 1.92 1.00 - 4.80 K/uL    Monos (Absolute) 0.57 0.00 - 0.85 K/uL    Eos (Absolute) 0.11 0.00 - 0.51 K/uL    Baso (Absolute) 0.04 0.00 - 0.12 K/uL    Immature Granulocytes (abs) 0.03 0.00 - 0.11 K/uL    NRBC (Absolute) 0.00 K/uL   COMP METABOLIC PANEL   Result Value Ref Range    Sodium 137 135 - 145 mmol/L    Potassium 3.9 3.6 - 5.5 mmol/L    Chloride 106 96 - 112 mmol/L    Co2 24 20 - 33 mmol/L    Anion Gap 7.0 0.0 - 11.9    Glucose 169 (H) 65 - 99 mg/dL    Bun 11 8 - 22 mg/dL    Creatinine 0.67 0.50 - 1.40 mg/dL    Calcium 8.6 8.4 - 10.2 mg/dL    AST(SGOT) 26 12 - 45 U/L    ALT(SGPT) 21 2 - 50 U/L    Alkaline Phosphatase 72 30 - 99 U/L    Total Bilirubin 0.7 0.1 - 1.5 mg/dL    Albumin 3.8 3.2 - 4.9 g/dL    Total Protein 6.0 6.0 - 8.2 g/dL    Globulin 2.2 1.9 - 3.5 g/dL    A-G Ratio 1.7 g/dL   PROTHROMBIN TIME   Result Value Ref Range    PT 13.3 12.0 - 14.6 sec    INR 1.04 0.87 - 1.13   ESTIMATED GFR   Result Value Ref Range    GFR If African American >60 >60 mL/min/1.73 m 2    GFR If Non African American >60 >60 mL/min/1.73 m 2   EKG (NOW)   Result Value Ref Range    Report       [facility] Emergency Dept.    Test Date:  2018  Pt Name:    PRASHANT RICE                 Department: ER  MRN:        [id number]                      Room:  Gender:     Male                         Technician: [id number]  :        [id number]                   Requested By:ER TRIAGE PROTOCOL  Order #:    [id number]                    Reading MD:    Measurements  Intervals                                Axis  Rate:       60                           P:          66  MO:         208                          QRS:        41  QRSD:        94                           T:          27  QT:         420  QTc:        420    Interpretive Statements  SINUS RHYTHM  Compared to ECG 10/06/2016 09:17:17  Sinus bradycardia no longer present     ISTAT CO2   Result Value Ref Range    Istat CO2 27 20 - 33 mmol/L   ISTAT GLUCOSE   Result Value Ref Range    Istat Glucose 120 (H) 65 - 99 mg/dL   ISTAT BUN   Result Value Ref Range    Istat Bun 11 8 - 22 mg/dL   ISTAT CREATININE   Result Value Ref Range    Istat Creatinine 0.5 0.5 - 1.4 mg/dL   ISTAT SODIUM   Result Value Ref Range    Istat Sodium 142 135 - 145 mmol/L   ISTAT POTASSIUM   Result Value Ref Range    Istat Potassium 4.5 3.6 - 5.5 mmol/L   ISTAT IONIZED CA   Result Value Ref Range    Istat Ionized Calcium 1.07 (L) 1.10 - 1.30 mmol/L   ISTAT CHLORIDE   Result Value Ref Range    Istat Chloride 106 96 - 112 mmol/L   ISTAT HEMATOCRIT AND HEMOGLOBIN   Result Value Ref Range    Istat Hematocrit 36 (L) 42 - 52 %    Istat Hemoglobin 12.2 (L) 14.0 - 18.0 g/dL       All labs reviewed by me.    RADIOLOGY  CT-CTA HEAD WITH & W/O-POST PROCESS   Final Result      1.  No acute intracranial findings.      2.  Mild diffuse atrophy and periventricular white matter change, consistent with chronic small vessel disease.      3.  No acute thrombus or aneurysm is identified.        The radiologist's interpretation of all radiological studies have been reviewed by me.    EKG Interpretation    Interpreted by me    Rhythm: normal sinus   Rate: 60  Axis: normal  Ectopy: none  Conduction: normal  ST Segments: no acute change  T Waves: no acute change  Q Waves: none    Clinical Impression: Comparison made to prior EKG from October 2016. No acute changes and normal EKG    COURSE & MEDICAL DECISION MAKING  Pertinent Labs & Imaging studies reviewed. (See chart for details)    Obtained and reviewed past medical records. Patient's last encounter was in December of last year he was seen for a saphenous thrombus. He followed up with the  anticoagulation clinic. The plan was for anticoagulations for 3 months. He did complete that course. It was discussed with him, further workup for spontaneous thromboembolic disease but he declined. Prior to that, patient was seen in 2005 for a fishhook injury.    4:45 AM - Patient seen and examined at bedside. This is a pleasant 67-year-old male presents with approximately 6 weeks of headache, the headache is migratory. It comes and goes. It is not associated with any specific neurologic deficits. He cannot determine any triggers. He does not have a history of headaches. He is not have signs or symptoms to suggest infectious etiology.     Ordered labs and imaging including a CT a head to evaluate his symptoms. The differential diagnoses include but are not limited to: Tension/migraine headache, medication reaction, CVA    Length of stay in the emergency Department extended secondary to lab delay. Chemistry machine down. [facility]    Patient's reevaluated the bedside. He does report persistent pain, minimally changed. I discussed with him CT findings which were overall unrevealing. Labs were unrevealing, although he did have an elevated blood sugar. At this time, I suggested treatment for a possible migraine type headache. He'll be given Decadron, Benadryl, antiemetics and Toradol    12:08 PM patient's reevaluated the bedside. He does report improvement in his headache. He states that when I initially saw him, he rated his pain 5-6 out of 10. Now it's 2-3 out of 10. I we spoke at length, about good sleep hygiene. This has been a big problem for him lately. He states he cannot turn off his brain. This may be related to his headaches. It also may be related to his decrease and hydrocodone as well as possibly, 2/2 Xarelto. At this time however, workup is unrevealing. He is feeling better, he is stable, and I think it's reasonable to discharge to home which is his preference.. I have advised him  to follow up with his primary care doctor. Given strict return precautions and opportunity for questions. Normal vital signs other than hypertension    Stable on discharge    FINAL IMPRESSION  1. Nonintractable headache, unspecified chronicity pattern, unspecified headache type    2. Insomnia, unspecified type

## 2018-04-03 NOTE — ED TRIAGE NOTES
"Chief Complaint   Patient presents with   • Head Ache     \"I woke up with major headache on the RT side of my head and shortly after that developed trouble breathing\". Reports blood clot to RLE in Jan, took xarelto until 3/5 (was told to stop). Reports freq HA since taking taking med but this is the worst.   • Shortness of Breath     Agree with triage rn.  Pt seen by erp  "

## 2018-04-03 NOTE — ED TRIAGE NOTES
"Chief Complaint   Patient presents with   • Head Ache     \"I woke up with major headache on the RT side of my head and shortly after that developed trouble breathing\". Reports blood clot to RLE in Jan, took xarelto until 3/5 (was told to stop). Reports freq HA since taking taking med but this is the worst.   • Shortness of Breath     Pt amb to triage following EKG for above. Denies chest pain, however reports dull ache to chest. Speaking in clear sentences, NAD noted. BP noted. Takes BP meds as rx'd. Pt returned to [facility]. Educated on triage process and to inform staff of any changes.     BP (!) 193/101   Pulse 64   Temp 36.1 °C (97 °F)   Resp 16   Ht 1.803 m (5' 11\")   Wt 122.8 kg (270 lb 11.6 oz)   SpO2 94%   BMI 37.76 kg/m²     "

## 2021-01-15 DIAGNOSIS — Z23 NEED FOR VACCINATION: ICD-10-CM

## 2021-08-05 ENCOUNTER — PATIENT OUTREACH (OUTPATIENT)
Dept: HEALTH INFORMATION MANAGEMENT | Facility: OTHER | Age: 71
End: 2021-08-05

## 2021-08-05 NOTE — NON-PROVIDER
Outcome: Scheduled Comprehensive Health Assessment.     Please transfer to Patient Outreach Team at [phone] when patient returns call.    [facility] Verified: yes    Attempt # 1

## 2021-08-13 PROBLEM — E55.9 VITAMIN D DEFICIENCY: Status: ACTIVE | Noted: 2021-08-13

## 2021-08-13 PROBLEM — I10 HYPERTENSION: Status: ACTIVE | Noted: 2021-08-13

## 2021-08-13 PROBLEM — M51.36 DEGENERATIVE DISC DISEASE, LUMBAR: Status: ACTIVE | Noted: 2021-08-13

## 2021-08-13 PROBLEM — E53.8 VITAMIN B12 DEFICIENCY: Status: ACTIVE | Noted: 2021-08-13

## 2021-08-13 PROBLEM — E78.5 DYSLIPIDEMIA: Status: ACTIVE | Noted: 2021-08-13

## 2021-08-13 PROBLEM — R06.83 SNORING: Status: ACTIVE | Noted: 2021-08-13

## 2021-08-13 PROBLEM — I26.99 PULMONARY EMBOLI (HCC): Status: ACTIVE | Noted: 2021-08-13

## 2021-08-13 PROBLEM — Z86.718 HISTORY OF DVT (DEEP VEIN THROMBOSIS): Status: ACTIVE | Noted: 2021-08-13

## 2021-08-13 PROBLEM — M51.369 DEGENERATIVE DISC DISEASE, LUMBAR: Status: ACTIVE | Noted: 2021-08-13

## 2021-08-13 PROBLEM — E11.9 TYPE 2 DIABETES MELLITUS WITHOUT COMPLICATION, WITHOUT LONG-TERM CURRENT USE OF INSULIN (HCC): Status: ACTIVE | Noted: 2021-08-13

## 2021-08-13 PROBLEM — E66.01 MORBID OBESITY DUE TO EXCESS CALORIES (HCC): Status: ACTIVE | Noted: 2021-08-13

## 2021-08-13 PROBLEM — F13.20 SEDATIVE HYPNOTIC OR ANXIOLYTIC DEPENDENCE (HCC): Status: ACTIVE | Noted: 2021-08-13

## 2022-04-18 PROBLEM — F11.20 OPIOID TYPE DEPENDENCE, CONTINUOUS (HCC): Status: RESOLVED | Noted: 2022-04-18 | Resolved: 2022-04-18

## 2022-04-18 PROBLEM — F11.20 OPIOID TYPE DEPENDENCE, CONTINUOUS (HCC): Status: ACTIVE | Noted: 2022-04-18

## 2022-04-18 PROBLEM — I26.99 PULMONARY EMBOLI (HCC): Status: RESOLVED | Noted: 2021-08-13 | Resolved: 2022-04-18

## 2022-04-18 PROBLEM — Z86.711 HISTORY OF PULMONARY EMBOLUS (PE): Status: ACTIVE | Noted: 2022-04-18

## 2022-04-18 PROBLEM — E66.01 MORBID OBESITY WITH BMI OF 40.0-44.9, ADULT (HCC): Status: ACTIVE | Noted: 2022-04-18

## 2022-04-18 PROBLEM — I82.409 DEEP VEIN THROMBOSIS (HCC): Status: RESOLVED | Noted: 2017-12-14 | Resolved: 2022-04-18

## 2022-04-18 PROBLEM — R94.30 NONSPECIFIC ABNORMAL FUNCTION STUDY, CARDIOVASCULAR: Status: ACTIVE | Noted: 2022-04-18

## 2022-04-18 PROBLEM — E66.01 MORBID OBESITY DUE TO EXCESS CALORIES (HCC): Status: RESOLVED | Noted: 2021-08-13 | Resolved: 2022-04-18

## 2023-04-03 PROBLEM — F13.20 SEDATIVE HYPNOTIC OR ANXIOLYTIC DEPENDENCE (HCC): Status: RESOLVED | Noted: 2021-08-13 | Resolved: 2023-04-03

## 2023-04-03 PROBLEM — I73.9 PERIPHERAL ARTERIAL DISEASE (HCC): Status: ACTIVE | Noted: 2023-04-03

## 2023-04-03 PROBLEM — D68.69 SECONDARY HYPERCOAGULABLE STATE (HCC): Status: ACTIVE | Noted: 2023-04-03

## 2023-04-03 PROBLEM — E66.01 MORBID OBESITY (HCC): Status: ACTIVE | Noted: 2023-04-03

## 2023-04-03 PROBLEM — E11.69 DYSLIPIDEMIA ASSOCIATED WITH TYPE 2 DIABETES MELLITUS (HCC): Status: ACTIVE | Noted: 2021-08-13

## 2023-04-03 PROBLEM — E11.59 HYPERTENSION ASSOCIATED WITH TYPE 2 DIABETES MELLITUS (HCC): Status: ACTIVE | Noted: 2021-08-13

## 2023-04-03 PROBLEM — E66.01 MORBID OBESITY WITH BMI OF 40.0-44.9, ADULT (HCC): Status: RESOLVED | Noted: 2022-04-18 | Resolved: 2023-04-03

## 2023-04-03 PROBLEM — R94.30 NONSPECIFIC ABNORMAL FUNCTION STUDY, CARDIOVASCULAR: Status: RESOLVED | Noted: 2022-04-18 | Resolved: 2023-04-03

## 2023-04-03 PROBLEM — I15.2 HYPERTENSION ASSOCIATED WITH TYPE 2 DIABETES MELLITUS (HCC): Status: ACTIVE | Noted: 2021-08-13

## 2024-06-05 ENCOUNTER — TELEPHONE (OUTPATIENT)
Dept: HEALTH INFORMATION MANAGEMENT | Facility: OTHER | Age: 74
End: 2024-06-05
Payer: MEDICARE

## 2024-07-09 PROBLEM — E11.65 TYPE 2 DIABETES MELLITUS WITH HYPERGLYCEMIA, WITHOUT LONG-TERM CURRENT USE OF INSULIN (HCC): Status: ACTIVE | Noted: 2021-08-13

## 2024-07-10 ENCOUNTER — OFFICE VISIT (OUTPATIENT)
Dept: FAMILY PLANNING/WOMEN'S HEALTH CLINIC | Facility: PHYSICIAN GROUP | Age: 74
End: 2024-07-10
Payer: MEDICARE

## 2024-07-10 ENCOUNTER — HOSPITAL ENCOUNTER (OUTPATIENT)
Facility: MEDICAL CENTER | Age: 74
End: 2024-07-10
Payer: MEDICARE

## 2024-07-10 VITALS
SYSTOLIC BLOOD PRESSURE: 130 MMHG | DIASTOLIC BLOOD PRESSURE: 68 MMHG | BODY MASS INDEX: 35 KG/M2 | WEIGHT: 250 LBS | HEIGHT: 71 IN

## 2024-07-10 DIAGNOSIS — E78.5 DYSLIPIDEMIA ASSOCIATED WITH TYPE 2 DIABETES MELLITUS (HCC): ICD-10-CM

## 2024-07-10 DIAGNOSIS — E11.69 DYSLIPIDEMIA ASSOCIATED WITH TYPE 2 DIABETES MELLITUS (HCC): ICD-10-CM

## 2024-07-10 DIAGNOSIS — E11.65 TYPE 2 DIABETES MELLITUS WITH HYPERGLYCEMIA, WITHOUT LONG-TERM CURRENT USE OF INSULIN (HCC): ICD-10-CM

## 2024-07-10 DIAGNOSIS — I15.2 HYPERTENSION ASSOCIATED WITH TYPE 2 DIABETES MELLITUS (HCC): ICD-10-CM

## 2024-07-10 DIAGNOSIS — E11.59 HYPERTENSION ASSOCIATED WITH TYPE 2 DIABETES MELLITUS (HCC): ICD-10-CM

## 2024-07-10 DIAGNOSIS — D69.2 SENILE PURPURA (HCC): ICD-10-CM

## 2024-07-10 DIAGNOSIS — E53.8 VITAMIN B12 DEFICIENCY: ICD-10-CM

## 2024-07-10 DIAGNOSIS — D68.69 SECONDARY HYPERCOAGULABLE STATE (HCC): ICD-10-CM

## 2024-07-10 PROBLEM — E66.01 MORBID OBESITY (HCC): Status: RESOLVED | Noted: 2023-04-03 | Resolved: 2024-07-10

## 2024-07-10 LAB
CREAT UR-MCNC: 65.89 MG/DL
HBA1C MFR BLD: 6.7 % (ref ?–5.8)
MICROALBUMIN UR-MCNC: <1.2 MG/DL
MICROALBUMIN/CREAT UR: NORMAL MG/G (ref 0–30)
POCT INT CON NEG: NEGATIVE
POCT INT CON POS: POSITIVE

## 2024-07-10 PROCEDURE — 1125F AMNT PAIN NOTED PAIN PRSNT: CPT

## 2024-07-10 PROCEDURE — 82570 ASSAY OF URINE CREATININE: CPT

## 2024-07-10 PROCEDURE — 83036 HEMOGLOBIN GLYCOSYLATED A1C: CPT

## 2024-07-10 PROCEDURE — G0439 PPPS, SUBSEQ VISIT: HCPCS

## 2024-07-10 PROCEDURE — 82043 UR ALBUMIN QUANTITATIVE: CPT

## 2024-07-10 RX ORDER — AMLODIPINE BESYLATE 5 MG/1
5 TABLET ORAL DAILY
COMMUNITY

## 2024-07-10 SDOH — ECONOMIC STABILITY: INCOME INSECURITY: HOW HARD IS IT FOR YOU TO PAY FOR THE VERY BASICS LIKE FOOD, HOUSING, MEDICAL CARE, AND HEATING?: NOT HARD AT ALL

## 2024-07-10 SDOH — ECONOMIC STABILITY: FOOD INSECURITY: WITHIN THE PAST 12 MONTHS, YOU WORRIED THAT YOUR FOOD WOULD RUN OUT BEFORE YOU GOT MONEY TO BUY MORE.: NEVER TRUE

## 2024-07-10 SDOH — ECONOMIC STABILITY: FOOD INSECURITY: WITHIN THE PAST 12 MONTHS, THE FOOD YOU BOUGHT JUST DIDN'T LAST AND YOU DIDN'T HAVE MONEY TO GET MORE.: NEVER TRUE

## 2024-07-10 SDOH — ECONOMIC STABILITY: TRANSPORTATION INSECURITY
IN THE PAST 12 MONTHS, HAS THE LACK OF TRANSPORTATION KEPT YOU FROM MEDICAL APPOINTMENTS OR FROM GETTING MEDICATIONS?: NO

## 2024-07-10 SDOH — ECONOMIC STABILITY: TRANSPORTATION INSECURITY
IN THE PAST 12 MONTHS, HAS LACK OF TRANSPORTATION KEPT YOU FROM MEETINGS, WORK, OR FROM GETTING THINGS NEEDED FOR DAILY LIVING?: NO

## 2024-07-10 ASSESSMENT — ACTIVITIES OF DAILY LIVING (ADL): BATHING_REQUIRES_ASSISTANCE: 0

## 2024-07-10 ASSESSMENT — ENCOUNTER SYMPTOMS: GENERAL WELL-BEING: GOOD

## 2024-07-10 ASSESSMENT — PAIN SCALES - GENERAL: PAINLEVEL: 4=SLIGHT-MODERATE PAIN

## 2024-07-10 ASSESSMENT — PATIENT HEALTH QUESTIONNAIRE - PHQ9: CLINICAL INTERPRETATION OF PHQ2 SCORE: 0

## 2024-07-30 ENCOUNTER — OFFICE VISIT (OUTPATIENT)
Dept: URGENT CARE | Facility: PHYSICIAN GROUP | Age: 74
End: 2024-07-30
Payer: MEDICARE

## 2024-07-30 ENCOUNTER — HOSPITAL ENCOUNTER (OUTPATIENT)
Dept: RADIOLOGY | Facility: MEDICAL CENTER | Age: 74
End: 2024-07-30
Attending: STUDENT IN AN ORGANIZED HEALTH CARE EDUCATION/TRAINING PROGRAM
Payer: MEDICARE

## 2024-07-30 VITALS
RESPIRATION RATE: 14 BRPM | OXYGEN SATURATION: 94 % | BODY MASS INDEX: 36.87 KG/M2 | HEART RATE: 60 BPM | WEIGHT: 263.34 LBS | TEMPERATURE: 97.1 F | SYSTOLIC BLOOD PRESSURE: 120 MMHG | HEIGHT: 71 IN | DIASTOLIC BLOOD PRESSURE: 70 MMHG

## 2024-07-30 DIAGNOSIS — M25.562 ACUTE PAIN OF LEFT KNEE: ICD-10-CM

## 2024-07-30 DIAGNOSIS — M17.12 PATELLOFEMORAL ARTHRITIS OF LEFT KNEE: ICD-10-CM

## 2024-07-30 PROCEDURE — 73562 X-RAY EXAM OF KNEE 3: CPT | Mod: LT

## 2024-07-30 RX ORDER — LIDOCAINE 50 MG/G
PATCH TOPICAL
COMMUNITY
Start: 2024-07-18

## 2025-02-14 ENCOUNTER — TELEPHONE (OUTPATIENT)
Dept: HEALTH INFORMATION MANAGEMENT | Facility: OTHER | Age: 75
End: 2025-02-14
Payer: COMMERCIAL

## 2025-02-24 ENCOUNTER — OFFICE VISIT (OUTPATIENT)
Dept: FAMILY PLANNING/WOMEN'S HEALTH CLINIC | Facility: PHYSICIAN GROUP | Age: 75
End: 2025-02-24
Payer: MEDICARE

## 2025-02-24 VITALS
HEIGHT: 71 IN | SYSTOLIC BLOOD PRESSURE: 118 MMHG | OXYGEN SATURATION: 92 % | HEART RATE: 58 BPM | WEIGHT: 263 LBS | BODY MASS INDEX: 36.82 KG/M2 | DIASTOLIC BLOOD PRESSURE: 68 MMHG | TEMPERATURE: 97.6 F

## 2025-02-24 DIAGNOSIS — E66.01 SEVERE OBESITY (BMI 35.0-39.9) WITH COMORBIDITY (HCC): ICD-10-CM

## 2025-02-24 DIAGNOSIS — D69.2 SENILE PURPURA (HCC): ICD-10-CM

## 2025-02-24 DIAGNOSIS — E78.5 DYSLIPIDEMIA ASSOCIATED WITH TYPE 2 DIABETES MELLITUS (HCC): ICD-10-CM

## 2025-02-24 DIAGNOSIS — I15.2 HYPERTENSION ASSOCIATED WITH TYPE 2 DIABETES MELLITUS (HCC): ICD-10-CM

## 2025-02-24 DIAGNOSIS — E11.59 HYPERTENSION ASSOCIATED WITH TYPE 2 DIABETES MELLITUS (HCC): ICD-10-CM

## 2025-02-24 DIAGNOSIS — E11.69 DYSLIPIDEMIA ASSOCIATED WITH TYPE 2 DIABETES MELLITUS (HCC): ICD-10-CM

## 2025-02-24 DIAGNOSIS — E55.9 VITAMIN D DEFICIENCY: ICD-10-CM

## 2025-02-24 DIAGNOSIS — G31.9 CEREBRAL ATROPHY (HCC): ICD-10-CM

## 2025-02-24 DIAGNOSIS — E11.65 TYPE 2 DIABETES MELLITUS WITH HYPERGLYCEMIA, WITHOUT LONG-TERM CURRENT USE OF INSULIN (HCC): ICD-10-CM

## 2025-02-24 LAB
HBA1C MFR BLD: 6.8 % (ref ?–5.8)
POCT INT CON NEG: NEGATIVE
POCT INT CON POS: POSITIVE

## 2025-02-24 PROCEDURE — 1126F AMNT PAIN NOTED NONE PRSNT: CPT

## 2025-02-24 PROCEDURE — 3074F SYST BP LT 130 MM HG: CPT

## 2025-02-24 PROCEDURE — 3078F DIAST BP <80 MM HG: CPT

## 2025-02-24 PROCEDURE — 83036 HEMOGLOBIN GLYCOSYLATED A1C: CPT

## 2025-02-24 PROCEDURE — G0439 PPPS, SUBSEQ VISIT: HCPCS

## 2025-02-24 SDOH — ECONOMIC STABILITY: FOOD INSECURITY: WITHIN THE PAST 12 MONTHS, THE FOOD YOU BOUGHT JUST DIDN'T LAST AND YOU DIDN'T HAVE MONEY TO GET MORE.: NEVER TRUE

## 2025-02-24 SDOH — ECONOMIC STABILITY: HOUSING INSECURITY: AT ANY TIME IN THE PAST 12 MONTHS, WERE YOU HOMELESS OR LIVING IN A SHELTER (INCLUDING NOW)?: NO

## 2025-02-24 SDOH — ECONOMIC STABILITY: HOUSING INSECURITY: IN THE PAST 12 MONTHS, HOW MANY TIMES HAVE YOU MOVED WHERE YOU WERE LIVING?: 0

## 2025-02-24 SDOH — ECONOMIC STABILITY: TRANSPORTATION INSECURITY: IN THE PAST 12 MONTHS, HAS LACK OF TRANSPORTATION KEPT YOU FROM MEDICAL APPOINTMENTS OR FROM GETTING MEDICATIONS?: NO

## 2025-02-24 SDOH — ECONOMIC STABILITY: FOOD INSECURITY: WITHIN THE PAST 12 MONTHS, YOU WORRIED THAT YOUR FOOD WOULD RUN OUT BEFORE YOU GOT THE MONEY TO BUY MORE.: NEVER TRUE

## 2025-02-24 SDOH — ECONOMIC STABILITY: HOUSING INSECURITY: IN THE LAST 12 MONTHS, WAS THERE A TIME WHEN YOU WERE NOT ABLE TO PAY THE MORTGAGE OR RENT ON TIME?: NO

## 2025-02-24 SDOH — ECONOMIC STABILITY: FOOD INSECURITY: HOW HARD IS IT FOR YOU TO PAY FOR THE VERY BASICS LIKE FOOD, HOUSING, MEDICAL CARE, AND HEATING?: NOT HARD AT ALL

## 2025-02-24 ASSESSMENT — PAIN SCALES - GENERAL: PAINLEVEL_OUTOF10: NO PAIN

## 2025-02-24 ASSESSMENT — ENCOUNTER SYMPTOMS: GENERAL WELL-BEING: GOOD

## 2025-02-24 ASSESSMENT — ACTIVITIES OF DAILY LIVING (ADL): LACK_OF_TRANSPORTATION: NO

## 2025-02-24 ASSESSMENT — PATIENT HEALTH QUESTIONNAIRE - PHQ9: CLINICAL INTERPRETATION OF PHQ2 SCORE: 0

## 2025-02-24 NOTE — ASSESSMENT & PLAN NOTE
Chronic, stable. Blood pressure today 118/68. The patient's blood pressure is well controlled with current medication. Continue with current defined treatment plan: amLODIPine (NORVASC) 5 MG Tab, losartan (COZAAR) 25 MG Tab. Follow-up at least annually.

## 2025-02-24 NOTE — PROGRESS NOTES
Comprehensive Health Assessment Program     Lamont Hogan is a 74 y.o. here for his comprehensive health assessment.    Patient Active Problem List    Diagnosis Date Noted    Cerebral atrophy ([facility]) 02/24/2025    BMI 36.0-36.9,adult 02/24/2025    Senile purpura ([facility]) 07/10/2024    Secondary hypercoagulable state ([facility]) 04/03/2023    Severe obesity (BMI 35.0-39.9) with comorbidity ([facility]) 04/03/2023    Peripheral arterial disease ([facility]) 04/03/2023    History of pulmonary embolus (PE) 04/18/2022    Hypertension associated with type 2 diabetes mellitus ([facility]) 08/13/2021    Dyslipidemia associated with type 2 diabetes mellitus ([facility]) 08/13/2021    Type 2 diabetes mellitus with hyperglycemia, without long-term current use of insulin ([facility]) 08/13/2021    Snoring 08/13/2021    Vitamin B12 deficiency 08/13/2021    Vitamin D deficiency 08/13/2021    Degenerative disc disease, lumbar 08/13/2021    History of DVT (deep vein thrombosis) 08/13/2021       Current Outpatient Medications   Medication Sig Dispense Refill    lidocaine (LIDODERM) 5 % Patch Place  on the skin.      diclofenac sodium (VOLTAREN) 1 % Gel Apply 2 g topically 4 times a day as needed (pain). 100 g 0    amLODIPine (NORVASC) 5 MG Tab Take 5 mg by mouth every day.      apixaban (ELIQUIS) 2.5mg Tab Take 2.5 mg by mouth 2 times a day.      propranolol (INDERAL) 20 MG Tab Take 20 mg by mouth 2 times a day.      Empagliflozin 25 MG Tab Take  by mouth.      losartan (COZAAR) 25 MG Tab Take 25 mg by mouth every day.      vitamin D (CHOLECALCIFEROL) 1000 UNIT Tab Take 1,000 Units by mouth every day.      cyanocobalamin (VITAMIN B12) 1000 MCG Tab Take 1,000 mcg by mouth every day.      metFORMIN ER (GLUCOPHAGE XR) 500 MG TABLET SR 24 HR Take 500 mg by mouth every day.      atorvastatin (LIPITOR) 10 MG Tab Take 10 mg by mouth every day.      gabapentin (NEURONTIN) 300 MG Cap Take 900 mg by mouth 3 times a day.       No current facility-administered medications for this  visit.          Current supplements as per medication list.     Allergies:   Ampicillin and Penicillin g  Social History     Tobacco Use    Smoking status: Former     Current packs/day: 0.00     Average packs/day: 1 pack/day for 30.0 years (30.0 ttl pk-yrs)     Types: Cigarettes     Start date: 1970     Quit date: 2000     Years since quittin.1    Smokeless tobacco: Never   Vaping Use    Vaping status: Never Used   Substance Use Topics    Alcohol use: No     Alcohol/week: 0.0 oz    Drug use: No     History reviewed. No pertinent family history.  Lamont  has a past medical history of Arthritis, BMI 40.0-44.9, adult ([facility]) (2022), Cataract, Combined forms of age-related cataract of right eye (10/11/2016), Deep vein thrombosis ([facility]) [I82.409] (2017), Diabetes ([facility]), Hypertension, Morbid obesity due to excess calories ([facility]) (2021), Morbid obesity with BMI of 40.0-44.9, adult ([facility]) (2022), Nonspecific abnormal function study, cardiovascular (2022), Pulmonary emboli ([facility]) (2021), and Sedative hypnotic or anxiolytic dependence ([facility]) (2021).   Past Surgical History:   Procedure Laterality Date    CATARACT PHACO WITH IOL Left 10/25/2016    Procedure: CATARACT PHACO WITH IOL;  Surgeon: Hever Marte M.D.;  Location: SURGERY [facility];  Service:     CATARACT PHACO WITH IOL Right 10/11/2016    Procedure: CATARACT PHACO WITH IOL;  Surgeon: Hever Marte M.D.;  Location: SURGERY [facility];  Service:     OTHER Left     hand x 2 post trauma    OTHER      low back surgery    APPENDECTOMY      MARIPOSA BY LAPAROSCOPY         Depression Screening  Little interest or pleasure in doing things?  0 - not at all  Feeling down, depressed , or hopeless? 0 - not at all  Patient Health Questionnaire Score: 0     If depressive symptoms identified deferred to follow up visit unless specifically addressed in assessment and plan.    Interpretation of  PHQ-9 Total Score   Score Severity   1-4 No Depression   5-9 Mild Depression   10-14 Moderate Depression   15-19 Moderately Severe Depression   20-27 Severe Depression    Screening for Cognitive Impairment  Do you or any of your friends or family members have any concern about your memory? No  Three Minute Recall (Village, Kitchen, Baby) 3/3    Peter clock face with all 12 numbers and set the hands to show 10 minutes past 11.  Yes 5/5  Cognitive concerns identified deferred for follow up unless specifically addressed in assessment and plan.    Fall Risk Assessment  Has the patient had two or more falls in the last year or any fall with injury in the last year?  No    Safety Assessment  Do you always wear your seatbelt?  Yes  Any changes to home needed to function safely? No  Difficulty hearing.  Yes  Patient counseled about all safety risks that were identified.    Functional Assessment ADLs  Are there any barriers preventing you from cooking for yourself or meeting nutritional needs?   .    Are there any barriers preventing you from driving safely or obtaining transportation?   .    Are there any barriers preventing you from using a telephone or calling for help?       Are there any barriers preventing you from shopping?   .    Are there any barriers preventing you from taking care of your own finances?       Are there any barriers preventing you from managing your medications?       Are there any barriers preventing you from showering, bathing or dressing yourself?      Are there any barriers preventing you from doing housework or laundry?    Are there any barriers preventing you from using the toilet?   Are you currently engaging in any exercise or physical activity?   .      Self-Assessment of Health  What is your perception of your health? Good    Do you sleep more than six hours a night? Yes    In the past 7 days, how much did pain keep you from doing your normal work? Some Lower back  Do you spend quality time  with family or friends (virtually or in person)? Yes    Do you usually eat a heart healthy diet that constists of a variety of fruits, vegetables, whole grains and fiber? Yes    Do you eat foods high in fat and/or Fast Food more than three times per week? Yes    How concerned are you that your medical conditions are not being well managed? Not at all    Are you worried that in the next 2 months, you may not have stable housing that you own, rent, or stay in as part of a household? No      Advance Care Planning  Do you have an Advance Directive, Living Will, Durable Power of , or POLST? Yes          is not on file - instructed patient to bring in a copy to scan into their chart      Health Maintenance Summary            Current Care Gaps       SERUM CREATININE (Yearly) Overdue since 4/3/2019      04/03/2018  COMP METABOLIC PANEL    12/09/2017  BASIC METABOLIC PANEL              Hepatitis C Screening (Once) Never done     No completion history exists for this topic.              Fasting Lipid Profile (Yearly) Never done     No completion history exists for this topic.              Diabetes: Monofilament / LE Exam (Yearly) Never done     No completion history exists for this topic.              Abdominal Aortic Aneurysm (AAA) Screening (Once) Never done     No completion history exists for this topic.                      Upcoming       Diabetes: Retinopathy Screening (Yearly) Next due on 6/4/2025 06/04/2024  Level of Service: MO EYE EXAM, EST PATIENT,COMPREHESV              Diabetes: Urine Protein Screening (Yearly) Next due on 7/10/2025      07/10/2024  MICROALBUMIN CREAT RATIO URINE              A1c Screening (Every 6 Months) Next due on 8/24/2025 02/24/2025  POCT Hemoglobin A1C    07/10/2024  POCT Hemoglobin A1C              IMM DTaP/Tdap/Td Vaccine (3 - Td or Tdap) Next due on 8/31/2032 08/31/2022  Outside Immunization: Tdap    10/19/2011  Outside Immunization: Tdap              Colorectal  Cancer Screening (Colonoscopy - Every 10 Years) Next due on 4/8/2034 04/08/2024  Colonoscopy (Reason not specified)                      Completed or No Longer Recommended       Influenza Vaccine (Series Information) Completed      10/10/2024  Outside Immunization: Influenza Tri, Adj    10/26/2023  Outside Immunization: Influenza Quad Adjuvanted    11/11/2022  Outside Immunization: Fluzone High-Dose Quad    10/19/2021  Outside Immunization: Fluzone High-Dose Quad    11/01/2020  Outside Immunization: INFLUENZA, UNSPECIFIED FORMULATION     Only the first 5 history entries have been loaded, but more history exists.            Zoster (Shingles) Vaccines (Series Information) Completed      11/30/2022  Outside Immunization: ZOSTER RECOMBINANT    08/31/2022  Outside Immunization: Zoster Dionicio (Shingrix)    11/02/2017  Outside Immunization: Zoster, Live (Zostavax)              COVID-19 Vaccine (Series Information) Completed      10/10/2024  Imm Admin: COVID-19, mRNA, LNP-S, PF, rama-sucrose, 30 mcg/0.3 mL    10/26/2023  Imm Admin: Comirnaty (Covid-19 Vaccine, Mrna, [id number] Formula)    03/29/2023  Imm Admin: MODERNA BIVALENT BOOSTER SARS-COV-2 VACCINE (6+)    10/28/2022  Imm Admin: MODERNA BIVALENT BOOSTER SARS-COV-2 VACCINE (6+)    04/27/2022  Imm Admin: MODERNA SARS-COV-2 VACCINE (12+)      Only the first 5 history entries have been loaded, but more history exists.              Pneumococcal Vaccine: 50+ Years (Series Information) Completed      11/02/2017  Outside Immunization: PPSV23    04/14/2015  Outside Immunization: PCV-13 (Prevnar 13)    04/27/2012  Outside Immunization: PPSV23              Hepatitis A Vaccine (Hep A) (Series Information) Aged Out      No completion history exists for this topic.              Hepatitis B Vaccine (Hep B) (Series Information) Aged Out     No completion history exists for this topic.              HPV Vaccines (Series Information) Aged Out     No completion history exists for this  "topic.              Polio Vaccine (Inactivated Polio) (Series Information) Aged Out     No completion history exists for this topic.              Meningococcal Immunization (Series Information) Aged Out     No completion history exists for this topic.              Annual Wellness Visit  Discontinued        Frequency changed to Never automatically (Topic No Longer Applies)    02/24/2025  Level of Service: [place] ANNUAL WELLNESS VISIT-INCLUDES PPPS SUBSEQUE*    07/10/2024  Done - Comprehensive Health Assessment    07/10/2024  Level of Service: [place] ANNUAL WELLNESS VISIT-INCLUDES PPPS SUBSEQUE*    04/03/2023  Level of Service: [place] ANNUAL WELLNESS VISIT-INCLUDES PPPS SUBSEQUE*     Only the first 5 history entries have been loaded, but more history exists.                          Patient Care Team:  Dae Morrow M.D. as PCP - General (Internal Medicine)  Josemanuel Gaxiola M.D. as PCP - [facility] Paneled  [facility] as Pharmacist      Financial Resource Strain: Low Risk  (2/24/2025)    Overall Financial Resource Strain (CARDIA)     Difficulty of Paying Living Expenses: Not hard at all      Transportation Needs: No Transportation Needs (2/24/2025)    PRAPARE - Transportation     Lack of Transportation (Medical): No     Lack of Transportation (Non-Medical): No      Food Insecurity: No Food Insecurity (2/24/2025)    Hunger Vital Sign     Worried About Running Out of Food in the Last Year: Never true     Ran Out of Food in the Last Year: Never true        Encounter Vitals  Temperature: 36.4 °C (97.6 °F)  Temp src: Temporal  Blood Pressure : 118/68  Pulse: (!) 58  Pulse Oximetry: 92 %  O2 Delivery Device: None - Room Air  Weight: 119 kg (263 lb)  Height: 180.3 cm (5' 11\")  BMI (Calculated): 36.68  Pain Score: No pain  DME  O2 Delivery Device: None - Room Air     ROS:  No fever, chills, nausea, vomiting, diarrhea, chest pain or shortness of breath. See HPI.    Physical Exam:  Constitutional: NAD  HENMT: NC/AT, " PERRLA, EOMI, OP clear, TM's clear, no lymphadenopathy, no thyromegaly.  No JVD.  Cardiovascular: RRR, No m/r/g  Lungs: CTAB, no w/r/r  Extremities: 2+ DP, PT and Radial pulses bilaterally. No c/c/e  Skin: No legions notes  Neurologic: Alert & oriented x3, CN II-XII grossly intact    Assessment and Plan. The following treatment and monitoring plan is recommended:  Dyslipidemia associated with type 2 diabetes mellitus (HCC)  Chronic, stable. The patient denies any side effects from the current medication. Continue with current defined treatment plan: atorvastatin (LIPITOR) 10 MG Tab . Follow-up at least annually.      Hypertension associated with type 2 diabetes mellitus (HCC)  Chronic, stable. Blood pressure today 118/68. The patient's blood pressure is well controlled with current medication. Continue with current defined treatment plan: amLODIPine (NORVASC) 5 MG Tab, losartan (COZAAR) 25 MG Tab. Follow-up at least annually.      Senile purpura (HCC)  Chronic, stable. The patient with scattered bruising to her bilateral forearms.  No current treatment. Consider wearing sunscreen or protective clothing while outdoors.  Follow-up at least annually.      Type 2 diabetes mellitus with hyperglycemia, without long-term current use of insulin (HCC)  Chronic, stable. A1c in office today 6.8. Continue with current defined treatment plan: metFORMIN ER (GLUCOPHAGE XR) 500 MG TABLET SR 24 HR, Empagliflozin 25 MG Tab. Follow-up at least annually.      Vitamin D deficiency  Chronic, stable. Continue with current defined treatment plan: vitamin D (CHOLECALCIFEROL) 1000 UNIT Tab . Follow-up at least annually.      Cerebral atrophy (HCC)  Chronic, stable. Noted on 04/03/22 Head CT.  The patient denies memory problems or cognitive decline. Three word recall today is 3/3.  The patient is able to draw the clock correctly. No current treatment. Follow-up at least annually.      Severe obesity (BMI 35.0-39.9) with comorbidity  (HCC)  Chronic, stable.  BMI 36.68.  Weight 263 lb.  Comorbidities of DM 2.  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 Discussed eating a diet that contains many vegetables, fruits, and whole grains; includes low-fat dairy products, poultry, fish, beans, non-tropical vegetable oils and nuts; and limit intake of sweets, sugar-sweetened drinks and red meats.   Follow-up at least annually.      BMI 36.0-36.9,adult  Chronic, stable.  BMI 36.68.  Weight 263 lb. 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 Discussed eating a diet that contains many vegetables, fruits, and whole grains; includes low-fat dairy products, poultry, fish, beans, non-tropical vegetable oils and nuts; and limit intake of sweets, sugar-sweetened drinks and red meats.   Follow-up at least annually.      Services suggested: No services needed at this time  Health Care Screening: Age-appropriate preventive services recommended by USPTF and ACIP covered by Medicare were discussed today. Services ordered if indicated and agreed upon by the patient.  Referrals offered: Community-based lifestyle interventions to reduce health risks and promote self-management and wellness, fall prevention, nutrition, physical activity, tobacco-use cessation, weight loss, and mental health services as per orders if indicated.    Discussion today about general wellness and lifestyle habits:    Prevent falls and reduce trip hazards; Cautioned about securing or removing rugs.  Have a working fire alarm and carbon monoxide detector.  Engage in regular physical activity and social activities.    Follow-up: Return for appointment with Primary Care Provider as needed.

## 2025-02-24 NOTE — ASSESSMENT & PLAN NOTE
Chronic, stable. A1c in office today 6.8. Continue with current defined treatment plan: metFORMIN ER (GLUCOPHAGE XR) 500 MG TABLET SR 24 HR, Empagliflozin 25 MG Tab. Follow-up at least annually.

## 2025-02-24 NOTE — ASSESSMENT & PLAN NOTE
Chronic, stable.  BMI 36.68.  Weight 263 lb.  Comorbidities of DM 2.  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 Discussed eating a diet that contains many vegetables, fruits, and whole grains; includes low-fat dairy products, poultry, fish, beans, non-tropical vegetable oils and nuts; and limit intake of sweets, sugar-sweetened drinks and red meats.   Follow-up at least annually.

## 2025-02-24 NOTE — ASSESSMENT & PLAN NOTE
Chronic, stable.  BMI 36.68.  Weight 263 lb. 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 Discussed eating a diet that contains many vegetables, fruits, and whole grains; includes low-fat dairy products, poultry, fish, beans, non-tropical vegetable oils and nuts; and limit intake of sweets, sugar-sweetened drinks and red meats.   Follow-up at least annually.

## 2025-02-24 NOTE — ASSESSMENT & PLAN NOTE
Chronic, stable. Noted on 04/03/22 Head CT.  The patient denies memory problems or cognitive decline. Three word recall today is 3/3.  The patient is able to draw the clock correctly. No current treatment. Follow-up at least annually.